# Patient Record
Sex: MALE | Race: OTHER | HISPANIC OR LATINO | URBAN - METROPOLITAN AREA
[De-identification: names, ages, dates, MRNs, and addresses within clinical notes are randomized per-mention and may not be internally consistent; named-entity substitution may affect disease eponyms.]

---

## 2020-06-28 ENCOUNTER — INPATIENT (INPATIENT)
Facility: HOSPITAL | Age: 35
LOS: 3 days | Discharge: HOME | End: 2020-07-02
Attending: INTERNAL MEDICINE | Admitting: INTERNAL MEDICINE
Payer: MEDICAID

## 2020-06-28 VITALS
OXYGEN SATURATION: 98 % | SYSTOLIC BLOOD PRESSURE: 141 MMHG | RESPIRATION RATE: 18 BRPM | DIASTOLIC BLOOD PRESSURE: 87 MMHG | TEMPERATURE: 99 F | HEART RATE: 98 BPM

## 2020-06-28 LAB
ALBUMIN SERPL ELPH-MCNC: 4.6 G/DL — SIGNIFICANT CHANGE UP (ref 3.5–5.2)
ALP SERPL-CCNC: 75 U/L — SIGNIFICANT CHANGE UP (ref 30–115)
ALT FLD-CCNC: 31 U/L — SIGNIFICANT CHANGE UP (ref 0–41)
ANION GAP SERPL CALC-SCNC: 13 MMOL/L — SIGNIFICANT CHANGE UP (ref 7–14)
AST SERPL-CCNC: 94 U/L — HIGH (ref 0–41)
BASOPHILS # BLD AUTO: 0.01 K/UL — SIGNIFICANT CHANGE UP (ref 0–0.2)
BASOPHILS NFR BLD AUTO: 0.2 % — SIGNIFICANT CHANGE UP (ref 0–1)
BILIRUB SERPL-MCNC: 0.5 MG/DL — SIGNIFICANT CHANGE UP (ref 0.2–1.2)
BUN SERPL-MCNC: 7 MG/DL — LOW (ref 10–20)
CALCIUM SERPL-MCNC: 8.9 MG/DL — SIGNIFICANT CHANGE UP (ref 8.5–10.1)
CHLORIDE SERPL-SCNC: 98 MMOL/L — SIGNIFICANT CHANGE UP (ref 98–110)
CK SERPL-CCNC: 2601 U/L — HIGH (ref 0–225)
CO2 SERPL-SCNC: 26 MMOL/L — SIGNIFICANT CHANGE UP (ref 17–32)
CREAT SERPL-MCNC: 0.6 MG/DL — LOW (ref 0.7–1.5)
EOSINOPHIL # BLD AUTO: 0.01 K/UL — SIGNIFICANT CHANGE UP (ref 0–0.7)
EOSINOPHIL NFR BLD AUTO: 0.2 % — SIGNIFICANT CHANGE UP (ref 0–8)
GLUCOSE SERPL-MCNC: 119 MG/DL — HIGH (ref 70–99)
HCT VFR BLD CALC: 37 % — LOW (ref 42–52)
HGB BLD-MCNC: 13.3 G/DL — LOW (ref 14–18)
IMM GRANULOCYTES NFR BLD AUTO: 0.3 % — SIGNIFICANT CHANGE UP (ref 0.1–0.3)
LYMPHOCYTES # BLD AUTO: 0.96 K/UL — LOW (ref 1.2–3.4)
LYMPHOCYTES # BLD AUTO: 14.6 % — LOW (ref 20.5–51.1)
MCHC RBC-ENTMCNC: 31.4 PG — HIGH (ref 27–31)
MCHC RBC-ENTMCNC: 35.9 G/DL — SIGNIFICANT CHANGE UP (ref 32–37)
MCV RBC AUTO: 87.3 FL — SIGNIFICANT CHANGE UP (ref 80–94)
MONOCYTES # BLD AUTO: 0.55 K/UL — SIGNIFICANT CHANGE UP (ref 0.1–0.6)
MONOCYTES NFR BLD AUTO: 8.4 % — SIGNIFICANT CHANGE UP (ref 1.7–9.3)
NEUTROPHILS # BLD AUTO: 5.01 K/UL — SIGNIFICANT CHANGE UP (ref 1.4–6.5)
NEUTROPHILS NFR BLD AUTO: 76.3 % — HIGH (ref 42.2–75.2)
NRBC # BLD: 0 /100 WBCS — SIGNIFICANT CHANGE UP (ref 0–0)
PLATELET # BLD AUTO: 147 K/UL — SIGNIFICANT CHANGE UP (ref 130–400)
POTASSIUM SERPL-MCNC: 3.2 MMOL/L — LOW (ref 3.5–5)
POTASSIUM SERPL-SCNC: 3.2 MMOL/L — LOW (ref 3.5–5)
PROT SERPL-MCNC: 7.2 G/DL — SIGNIFICANT CHANGE UP (ref 6–8)
RBC # BLD: 4.24 M/UL — LOW (ref 4.7–6.1)
RBC # FLD: 12.9 % — SIGNIFICANT CHANGE UP (ref 11.5–14.5)
SODIUM SERPL-SCNC: 137 MMOL/L — SIGNIFICANT CHANGE UP (ref 135–146)
WBC # BLD: 6.56 K/UL — SIGNIFICANT CHANGE UP (ref 4.8–10.8)
WBC # FLD AUTO: 6.56 K/UL — SIGNIFICANT CHANGE UP (ref 4.8–10.8)

## 2020-06-28 PROCEDURE — 99285 EMERGENCY DEPT VISIT HI MDM: CPT

## 2020-06-28 RX ORDER — KETOROLAC TROMETHAMINE 30 MG/ML
30 SYRINGE (ML) INJECTION ONCE
Refills: 0 | Status: DISCONTINUED | OUTPATIENT
Start: 2020-06-28 | End: 2020-06-28

## 2020-06-28 RX ORDER — SODIUM CHLORIDE 9 MG/ML
1000 INJECTION INTRAMUSCULAR; INTRAVENOUS; SUBCUTANEOUS ONCE
Refills: 0 | Status: COMPLETED | OUTPATIENT
Start: 2020-06-28 | End: 2020-06-28

## 2020-06-28 RX ORDER — ACETAMINOPHEN 500 MG
650 TABLET ORAL ONCE
Refills: 0 | Status: COMPLETED | OUTPATIENT
Start: 2020-06-28 | End: 2020-06-28

## 2020-06-28 RX ADMIN — Medication 650 MILLIGRAM(S): at 22:34

## 2020-06-28 RX ADMIN — SODIUM CHLORIDE 1000 MILLILITER(S): 9 INJECTION INTRAMUSCULAR; INTRAVENOUS; SUBCUTANEOUS at 22:33

## 2020-06-28 RX ADMIN — Medication 30 MILLIGRAM(S): at 22:34

## 2020-06-28 NOTE — ED PROVIDER NOTE - ATTENDING CONTRIBUTION TO CARE
1 week of sub fever, body aches, headache, chest congestion. denies covid exposure. exam shows well appearing no rash, no neck rigidity, lungs cta. plan to obtain imaging and labs. 1 week of sub fever, body aches, headache, chest congestion and weight loss. denies covid exposure. exam shows well appearing no rash, no neck rigidity, lungs cta. plan to obtain imaging and labs.

## 2020-06-28 NOTE — ED PROVIDER NOTE - PHYSICAL EXAMINATION
CONSTITUTIONAL: Well-appearing; well-nourished; in no apparent distress.   EYES: PERRL; EOM intact.   ENT: normal nose; no rhinorrhea; normal pharynx with no tonsillar hypertrophy.   NECK: Supple; non-tender; no cervical lymphadenopathy. No JVD.   CARDIOVASCULAR: Normal S1, S2; no murmurs, rubs, or gallops.   RESPIRATORY: Normal chest excursion with respiration; breath sounds clear and equal bilaterally; no wheezes, rhonchi, or rales.  GI/: Normal bowel sounds; non-distended; non-tender; no palpable organomegaly.   MS: No evidence of trauma or deformity. Non-tender to palpation. No scoliosis. No CVA tenderness. Normal ROM in all four extremities; non-tender to palpation; distal pulses are normal.   SKIN: Normal for age and race; warm; dry; good turgor; no apparent lesions or exudate.   NEURO/PSYCH: A & O x 4; grossly unremarkable.

## 2020-06-28 NOTE — ED PROVIDER NOTE - NS ED ROS FT
Constitutional: + chills + weight loss  no fever, change in appetite or malaise  Eyes: no redness/discharge/pain/vision changes  ENT: no rhinorrhea/ear pain/sore throat  Cardiac: No chest pain, SOB or edema.  Respiratory: + cough No respiratory distress  GI: No nausea, vomiting, diarrhea or abdominal pain.  : No dysuria, frequency, urgency or hematuria  MS: + myalgia no pain to back or extremities, no loss of ROM, no weakness  Neuro: No headache or weakness. No LOC.  Skin: No skin rash.  Endocrine: No history of thyroid disease or diabetes.

## 2020-06-28 NOTE — ED PROVIDER NOTE - CLINICAL SUMMARY MEDICAL DECISION MAKING FREE TEXT BOX
body aches with history of exertion, found to have elevated CK consistent with rhabdo, started on ivf for hydration and will admit for repeat CK labs.

## 2020-06-28 NOTE — ED PROVIDER NOTE - OBJECTIVE STATEMENT
34 yo male no sig hx present c/o flu like illness x 1 week. + body aches + cough + weakness + chill. Denies fever/ sore throat/ ear pain. denies sick contact and recent travel. Denies HA/dizziness/chest pain/abd pain/vomiting and diarrhea/urinary sxs.  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 denies fall and injury. denies hx of STD. also reports excessive weight loss over the past 3 months.

## 2020-06-29 LAB
A1C WITH ESTIMATED AVERAGE GLUCOSE RESULT: 5.7 % — HIGH (ref 4–5.6)
ALBUMIN SERPL ELPH-MCNC: 4 G/DL — SIGNIFICANT CHANGE UP (ref 3.5–5.2)
ALP SERPL-CCNC: 63 U/L — SIGNIFICANT CHANGE UP (ref 30–115)
ALT FLD-CCNC: 26 U/L — SIGNIFICANT CHANGE UP (ref 0–41)
ANION GAP SERPL CALC-SCNC: 12 MMOL/L — SIGNIFICANT CHANGE UP (ref 7–14)
APPEARANCE UR: CLEAR — SIGNIFICANT CHANGE UP
APTT BLD: 29.8 SEC — SIGNIFICANT CHANGE UP (ref 27–39.2)
AST SERPL-CCNC: 76 U/L — HIGH (ref 0–41)
BASOPHILS # BLD AUTO: 0.01 K/UL — SIGNIFICANT CHANGE UP (ref 0–0.2)
BASOPHILS NFR BLD AUTO: 0.2 % — SIGNIFICANT CHANGE UP (ref 0–1)
BILIRUB SERPL-MCNC: 0.8 MG/DL — SIGNIFICANT CHANGE UP (ref 0.2–1.2)
BILIRUB UR-MCNC: NEGATIVE — SIGNIFICANT CHANGE UP
BLD GP AB SCN SERPL QL: SIGNIFICANT CHANGE UP
BUN SERPL-MCNC: 5 MG/DL — LOW (ref 10–20)
C TRACH RRNA SPEC QL NAA+PROBE: SIGNIFICANT CHANGE UP
CALCIUM SERPL-MCNC: 7.7 MG/DL — LOW (ref 8.5–10.1)
CHLORIDE SERPL-SCNC: 106 MMOL/L — SIGNIFICANT CHANGE UP (ref 98–110)
CHOLEST SERPL-MCNC: 118 MG/DL — SIGNIFICANT CHANGE UP (ref 100–200)
CK MB CFR SERPL CALC: 40.2 NG/ML — HIGH (ref 0.6–6.3)
CK SERPL-CCNC: 1852 U/L — HIGH (ref 0–225)
CO2 SERPL-SCNC: 24 MMOL/L — SIGNIFICANT CHANGE UP (ref 17–32)
COLOR SPEC: COLORLESS — SIGNIFICANT CHANGE UP
CREAT SERPL-MCNC: 0.5 MG/DL — LOW (ref 0.7–1.5)
CRP SERPL-MCNC: 0.14 MG/DL — SIGNIFICANT CHANGE UP (ref 0–0.4)
D DIMER BLD IA.RAPID-MCNC: 162 NG/ML DDU — SIGNIFICANT CHANGE UP (ref 0–230)
DIFF PNL FLD: NEGATIVE — SIGNIFICANT CHANGE UP
EOSINOPHIL # BLD AUTO: 0.02 K/UL — SIGNIFICANT CHANGE UP (ref 0–0.7)
EOSINOPHIL NFR BLD AUTO: 0.4 % — SIGNIFICANT CHANGE UP (ref 0–8)
ERYTHROCYTE [SEDIMENTATION RATE] IN BLOOD: 8 MM/HR — SIGNIFICANT CHANGE UP (ref 0–10)
ESTIMATED AVERAGE GLUCOSE: 117 MG/DL — HIGH (ref 68–114)
FLU A RESULT: NEGATIVE — SIGNIFICANT CHANGE UP
FLU A RESULT: NEGATIVE — SIGNIFICANT CHANGE UP
FLUAV AG NPH QL: NEGATIVE — SIGNIFICANT CHANGE UP
FLUBV AG NPH QL: NEGATIVE — SIGNIFICANT CHANGE UP
GLUCOSE SERPL-MCNC: 91 MG/DL — SIGNIFICANT CHANGE UP (ref 70–99)
GLUCOSE UR QL: NEGATIVE — SIGNIFICANT CHANGE UP
HCT VFR BLD CALC: 34.4 % — LOW (ref 42–52)
HDLC SERPL-MCNC: 59 MG/DL — SIGNIFICANT CHANGE UP
HGB BLD-MCNC: 12.1 G/DL — LOW (ref 14–18)
IMM GRANULOCYTES NFR BLD AUTO: 0.4 % — HIGH (ref 0.1–0.3)
INR BLD: 0.98 RATIO — SIGNIFICANT CHANGE UP (ref 0.65–1.3)
KETONES UR-MCNC: NEGATIVE — SIGNIFICANT CHANGE UP
LEUKOCYTE ESTERASE UR-ACNC: NEGATIVE — SIGNIFICANT CHANGE UP
LIPID PNL WITH DIRECT LDL SERPL: 51 MG/DL — SIGNIFICANT CHANGE UP (ref 4–129)
LYMPHOCYTES # BLD AUTO: 0.75 K/UL — LOW (ref 1.2–3.4)
LYMPHOCYTES # BLD AUTO: 13.1 % — LOW (ref 20.5–51.1)
MAGNESIUM SERPL-MCNC: 2.1 MG/DL — SIGNIFICANT CHANGE UP (ref 1.8–2.4)
MCHC RBC-ENTMCNC: 30.9 PG — SIGNIFICANT CHANGE UP (ref 27–31)
MCHC RBC-ENTMCNC: 35.2 G/DL — SIGNIFICANT CHANGE UP (ref 32–37)
MCV RBC AUTO: 87.8 FL — SIGNIFICANT CHANGE UP (ref 80–94)
MONOCYTES # BLD AUTO: 0.58 K/UL — SIGNIFICANT CHANGE UP (ref 0.1–0.6)
MONOCYTES NFR BLD AUTO: 10.2 % — HIGH (ref 1.7–9.3)
N GONORRHOEA RRNA SPEC QL NAA+PROBE: SIGNIFICANT CHANGE UP
NEUTROPHILS # BLD AUTO: 4.33 K/UL — SIGNIFICANT CHANGE UP (ref 1.4–6.5)
NEUTROPHILS NFR BLD AUTO: 75.7 % — HIGH (ref 42.2–75.2)
NITRITE UR-MCNC: NEGATIVE — SIGNIFICANT CHANGE UP
NRBC # BLD: 0 /100 WBCS — SIGNIFICANT CHANGE UP (ref 0–0)
PH UR: 7 — SIGNIFICANT CHANGE UP (ref 5–8)
PHOSPHATE SERPL-MCNC: 3.9 MG/DL — SIGNIFICANT CHANGE UP (ref 2.1–4.9)
PLATELET # BLD AUTO: 122 K/UL — LOW (ref 130–400)
POTASSIUM SERPL-MCNC: 3.2 MMOL/L — LOW (ref 3.5–5)
POTASSIUM SERPL-SCNC: 3.2 MMOL/L — LOW (ref 3.5–5)
PROCALCITONIN SERPL-MCNC: 0.04 NG/ML — SIGNIFICANT CHANGE UP (ref 0.02–0.1)
PROT SERPL-MCNC: 6 G/DL — SIGNIFICANT CHANGE UP (ref 6–8)
PROT UR-MCNC: NEGATIVE — SIGNIFICANT CHANGE UP
PROTHROM AB SERPL-ACNC: 11.3 SEC — SIGNIFICANT CHANGE UP (ref 9.95–12.87)
RBC # BLD: 3.92 M/UL — LOW (ref 4.7–6.1)
RBC # FLD: 13.1 % — SIGNIFICANT CHANGE UP (ref 11.5–14.5)
RSV RESULT: NEGATIVE — SIGNIFICANT CHANGE UP
RSV RNA RESP QL NAA+PROBE: NEGATIVE — SIGNIFICANT CHANGE UP
SARS-COV-2 RNA SPEC QL NAA+PROBE: SIGNIFICANT CHANGE UP
SODIUM SERPL-SCNC: 142 MMOL/L — SIGNIFICANT CHANGE UP (ref 135–146)
SP GR SPEC: 1 — LOW (ref 1.01–1.02)
SPECIMEN SOURCE: SIGNIFICANT CHANGE UP
TOTAL CHOLESTEROL/HDL RATIO MEASUREMENT: 2 RATIO — LOW (ref 4–5.5)
TRIGL SERPL-MCNC: 30 MG/DL — SIGNIFICANT CHANGE UP (ref 10–149)
TROPONIN T SERPL-MCNC: <0.01 NG/ML — SIGNIFICANT CHANGE UP
URATE SERPL-MCNC: 3.6 MG/DL — SIGNIFICANT CHANGE UP (ref 3.4–8.8)
UROBILINOGEN FLD QL: SIGNIFICANT CHANGE UP
WBC # BLD: 5.71 K/UL — SIGNIFICANT CHANGE UP (ref 4.8–10.8)
WBC # FLD AUTO: 5.71 K/UL — SIGNIFICANT CHANGE UP (ref 4.8–10.8)

## 2020-06-29 PROCEDURE — 71045 X-RAY EXAM CHEST 1 VIEW: CPT | Mod: 26

## 2020-06-29 PROCEDURE — 93010 ELECTROCARDIOGRAM REPORT: CPT

## 2020-06-29 PROCEDURE — 99222 1ST HOSP IP/OBS MODERATE 55: CPT | Mod: AI

## 2020-06-29 RX ORDER — POTASSIUM CHLORIDE 20 MEQ
40 PACKET (EA) ORAL EVERY 4 HOURS
Refills: 0 | Status: COMPLETED | OUTPATIENT
Start: 2020-06-29 | End: 2020-06-29

## 2020-06-29 RX ORDER — CHLORHEXIDINE GLUCONATE 213 G/1000ML
1 SOLUTION TOPICAL
Refills: 0 | Status: DISCONTINUED | OUTPATIENT
Start: 2020-06-29 | End: 2020-07-02

## 2020-06-29 RX ORDER — SODIUM CHLORIDE 9 MG/ML
1000 INJECTION INTRAMUSCULAR; INTRAVENOUS; SUBCUTANEOUS
Refills: 0 | Status: DISCONTINUED | OUTPATIENT
Start: 2020-06-29 | End: 2020-07-01

## 2020-06-29 RX ORDER — ENOXAPARIN SODIUM 100 MG/ML
40 INJECTION SUBCUTANEOUS DAILY
Refills: 0 | Status: DISCONTINUED | OUTPATIENT
Start: 2020-06-29 | End: 2020-07-02

## 2020-06-29 RX ORDER — PANTOPRAZOLE SODIUM 20 MG/1
40 TABLET, DELAYED RELEASE ORAL
Refills: 0 | Status: DISCONTINUED | OUTPATIENT
Start: 2020-06-29 | End: 2020-07-02

## 2020-06-29 RX ORDER — ACETAMINOPHEN 500 MG
650 TABLET ORAL EVERY 6 HOURS
Refills: 0 | Status: DISCONTINUED | OUTPATIENT
Start: 2020-06-29 | End: 2020-07-02

## 2020-06-29 RX ADMIN — PANTOPRAZOLE SODIUM 40 MILLIGRAM(S): 20 TABLET, DELAYED RELEASE ORAL at 07:55

## 2020-06-29 RX ADMIN — SODIUM CHLORIDE 200 MILLILITER(S): 9 INJECTION INTRAMUSCULAR; INTRAVENOUS; SUBCUTANEOUS at 07:45

## 2020-06-29 RX ADMIN — SODIUM CHLORIDE 100 MILLILITER(S): 9 INJECTION INTRAMUSCULAR; INTRAVENOUS; SUBCUTANEOUS at 08:55

## 2020-06-29 RX ADMIN — Medication 40 MILLIEQUIVALENT(S): at 11:37

## 2020-06-29 RX ADMIN — SODIUM CHLORIDE 100 MILLILITER(S): 9 INJECTION INTRAMUSCULAR; INTRAVENOUS; SUBCUTANEOUS at 14:42

## 2020-06-29 RX ADMIN — SODIUM CHLORIDE 100 MILLILITER(S): 9 INJECTION INTRAMUSCULAR; INTRAVENOUS; SUBCUTANEOUS at 21:56

## 2020-06-29 RX ADMIN — Medication 40 MILLIEQUIVALENT(S): at 14:38

## 2020-06-29 RX ADMIN — SODIUM CHLORIDE 200 MILLILITER(S): 9 INJECTION INTRAMUSCULAR; INTRAVENOUS; SUBCUTANEOUS at 01:06

## 2020-06-29 RX ADMIN — ENOXAPARIN SODIUM 40 MILLIGRAM(S): 100 INJECTION SUBCUTANEOUS at 11:37

## 2020-06-29 NOTE — H&P ADULT - ATTENDING COMMENTS
35 yr Arabic speaking male came to ED for fever, body aches, headache and cough. Symptoms started 3 days ago, he has sick contact with his roommate who was diagnosed with COVID-19, he has cough with mild streaks of blood, also has severe frontal headache, pleuritic chest pain, he reports watery diarrhea. In ED: VS:WNL, labs significant for high CK, negative UA, Flu , RSV negative, COVID 19 pending, CXR no congestion/diffuse opacities.     PHYSICAL EXAM:  GENERAL: NAD, well-developed.  HEAD:  Atraumatic, Normocephalic.  EYES: EOMI, PERRLA, conjunctiva and sclera clear.  NECK: Supple, No JVD.  CHEST/LUNG: Clear to auscultation bilaterally; No wheeze.  HEART: Regular rate and rhythm; S1 S2.   ABDOMEN: Soft, Nontender, Nondistended; Bowel sounds present.  EXTREMITIES:  2+ Peripheral Pulses, No clubbing, cyanosis, or edema.  PSYCH: AAOx3.  NEUROLOGY: non-focal.  SKIN: No rashes or lesions.    A/P:   Upper respiratory tract infection: likely viral. Typical symptoms for COVID-19  Influenza negative. Pending COVID swab.   Tylenol prn. CXR is clear. No indication for further treatment.     Mild Rhabdomyolysis: unclear etiology, possible from fever, he denies fall, trauma. Continue IV fluid.

## 2020-06-29 NOTE — H&P ADULT - ASSESSMENT
35 yr old Male with no Pmh or Psh came to ED with chief complaint of  3 days of subjective fever, body aches,eye aches,  fatigue , bifrontal headache. A week ago he noticed cough with blood tinged sputum, pleuritic chest pain. he also reports of having loose watery stools for 4 days around 4-5 Bm per day.  Has covid 19 exposure as one of his roomates was tested positive last week. Patient also reports that he has lost 10lbs in last 3 months. Denies night sweats, trauma or crush injuries, dark urine. In ED: VS:WNL, labs significant for high CK, negative UA, Flu , RSV negative, COVID 19 pending, CXR no congestion/diffuse opacities. Patient sexually active, denies any penile discharge.    #fever/cough/ body aches/diarrhea/elevated CK likely rhabdomyolysis r/o viral respiratory syndrome  - low grade temp+ sepsis ruled out on admission, CK:2000  - Flu A, B , RSV negative  - COVID swab pending, exposure+  - CXR: no congestion/opacities  - UA negative for hematuria, fu urine cx (sent by ED)  - fu GC/chlamydia/ HIV  - fu EKG   - fu procal, CRP, ESR, calcium , phosphorus, uric acid, repeat CK  - Supportive care: tylenol  pain, antussive, antiemetics  - montior for hypoxia and if diarrhea continue send GI PCR  - isolation precautions    #Misc:  ACTIVITY: Increase as tolerated   DVT PPX: lovenox  GI PPX: protonix  DIET: Regular  CODE: Full  DIsposition: from home; 35 yr old Male with no Pmh or Psh came to ED with chief complaint of  3 days of subjective fever, body aches,eye aches,  fatigue , bifrontal headache. A week ago he noticed cough with blood tinged sputum, pleuritic chest pain. he also reports of having loose watery stools for 4 days around 4-5 Bm per day.  Has covid 19 exposure as one of his roomates was tested positive last week. Patient also reports that he has lost 10lbs in last 3 months. Denies night sweats, trauma or crush injuries, dark urine. In ED: VS:WNL, labs significant for high CK, negative UA, Flu , RSV negative, COVID 19 pending, CXR no congestion/diffuse opacities. Patient sexually active, denies any penile discharge.    #fever/cough/ body aches/diarrhea/elevated CK likely rhabdomyolysis r/o viral respiratory syndrome  - low grade temp+ sepsis ruled out on admission, CK:2000  - Flu A, B , RSV negative  - COVID swab pending, exposure+  - CXR: no congestion/opacities  - UA negative for hematuria, fu urine cx (sent by ED)  - fu GC/chlamydia/ HIV  - fu EKG   - fu procal, CRP, ESR, calcium , phosphorus, uric acid, repeat CK  - c/w IV NS   - Supportive care: tylenol  pain, antussive, antiemetics  - montior for hypoxia and if diarrhea continues send GI PCR  - isolation precautions    #Misc:  ACTIVITY: Increase as tolerated   DVT PPX: lovenox  GI PPX: protonix  DIET: Regular  CODE: Full  DIsposition: from home;

## 2020-06-29 NOTE — H&P ADULT - NSHPREVIEWOFSYSTEMS_GEN_ALL_CORE
CONSTITUTIONAL:  weakness, fevers or chills+  EYES/ENT: No visual changes;  No vertigo or throat pain   NECK: No pain or stiffness  RESPIRATORY:  cough+, no wheezing,  No shortness of breath  CARDIOVASCULAR: No chest pain or palpitations  GASTROINTESTINAL: Diarrhea+ No abdominal or epigastric pain. No nausea, vomiting, or hematemesis; No constipation. No melena or hematochezia.  GENITOURINARY: No dysuria, frequency or hematuria  NEUROLOGICAL: No numbness or weakness  SKIN: No itching, rashes

## 2020-06-29 NOTE — H&P ADULT - NSHPLABSRESULTS_GEN_ALL_CORE
13.3   6.56  )-----------( 147      ( 2020 22:38 )             37.0           137  |  98  |  7<L>  ----------------------------<  119<H>  3.2<L>   |  26  |  0.6<L>    Ca    8.9      2020 22:38    TPro  7.2  /  Alb  4.6  /  TBili  0.5  /  DBili  x   /  AST  94<H>  /  ALT  31  /  AlkPhos  75                Urinalysis Basic - ( 2020 22:21 )    Color: Colorless / Appearance: Clear / S.005 / pH: x  Gluc: x / Ketone: Negative  / Bili: Negative / Urobili: <2 mg/dL   Blood: x / Protein: Negative / Nitrite: Negative   Leuk Esterase: Negative / RBC: x / WBC x   Sq Epi: x / Non Sq Epi: x / Bacteria: x      CARDIAC MARKERS ( 2020 22:38 )  x     / x     / 2601 U/L / x     / x            CAPILLARY BLOOD GLUCOSE      POCT Blood Glucose.: 122 mg/dL (2020 22:13)

## 2020-06-29 NOTE — H&P ADULT - NSHPPHYSICALEXAM_GEN_ALL_CORE
PHYSICAL EXAM:  GENERAL: NAD, AAO x 4, 35y M  HEAD:  Atraumatic, Normocephalic  EYES: EOMI, conjunctiva and sclera white  NECK: Supple, No JVD  CHEST/LUNG: Clear to auscultation bilaterally; No wheeze; No crackles; No accessory muscles used  HEART: Regular rate and rhythm; No murmurs;   ABDOMEN: Soft, Nontender, Nondistended; Bowel sounds present; No guarding, No organomegaly  EXTREMITIES: muscle tenderness+ 2+ Peripheral Pulses, No cyanosis or edema  NEUROLOGY: non-focal

## 2020-06-29 NOTE — H&P ADULT - NSHPSOCIALHISTORY_GEN_ALL_CORE
Marital Status:  (   )    (  x ) Single    (   )    (  )   Lives with: (  ) alone  (  ) children   (  ) spouse   (  ) parents  (  x) other: roomates  Recent Travel: No recent travel  Occupation: lawn mower    Substance Use (street drugs): ( x ) never used  (  ) other:  Tobacco Usage:  ( x  ) never smoked   (   ) former smoker   (   ) current smoker  (     ) pack year  Alcohol Usage: ocassionally beer  Baseline mobility: mobile without any assistance

## 2020-06-29 NOTE — H&P ADULT - HISTORY OF PRESENT ILLNESS
35 yr old Male with no Pmh or Psh came to ED with chief complaint of  1 week of subjective fever, body aches, headache, chest congestion and weight loss. denies covid exposure. exam shows well appearing no rash, no neck rigidity, lungs cta. plan to obtain imaging and labs.    In ED: VS:WNL, labs significant for high CK, negtaive UA 35 yr old Male with no Pmh or Psh came to ED with chief complaint of  3 days of subjective fever, body aches,eye aches,  fatigue , bifrontal headache. A week ago he noticed cough with blood tinged sputum, pleuritic chest pain. he also reports of having loose watery stools for 4 days around 4-5 Bm per day.  Has covid 19 exposure as one of his roomates was tested positive last week. Patient also reports that he has lost 10lbs in last 3 months. Denies night sweats, trauma or crush injuries, dark urine.    In ED: VS:WNL, labs significant for high CK, negative UA, Flu , RSV negative, COVID 19 pending, CXR no congestion/diffuse opacities.

## 2020-06-30 LAB
ALBUMIN SERPL ELPH-MCNC: 4 G/DL — SIGNIFICANT CHANGE UP (ref 3.5–5.2)
ALP SERPL-CCNC: 76 U/L — SIGNIFICANT CHANGE UP (ref 30–115)
ALT FLD-CCNC: 29 U/L — SIGNIFICANT CHANGE UP (ref 0–41)
ANION GAP SERPL CALC-SCNC: 10 MMOL/L — SIGNIFICANT CHANGE UP (ref 7–14)
AST SERPL-CCNC: 67 U/L — HIGH (ref 0–41)
BASOPHILS # BLD AUTO: 0.01 K/UL — SIGNIFICANT CHANGE UP (ref 0–0.2)
BASOPHILS NFR BLD AUTO: 0.2 % — SIGNIFICANT CHANGE UP (ref 0–1)
BILIRUB SERPL-MCNC: 0.5 MG/DL — SIGNIFICANT CHANGE UP (ref 0.2–1.2)
BUN SERPL-MCNC: 5 MG/DL — LOW (ref 10–20)
CALCIUM SERPL-MCNC: 8 MG/DL — LOW (ref 8.5–10.1)
CHLORIDE SERPL-SCNC: 105 MMOL/L — SIGNIFICANT CHANGE UP (ref 98–110)
CK SERPL-CCNC: 1643 U/L — HIGH (ref 0–225)
CO2 SERPL-SCNC: 26 MMOL/L — SIGNIFICANT CHANGE UP (ref 17–32)
CREAT SERPL-MCNC: 0.5 MG/DL — LOW (ref 0.7–1.5)
CRP SERPL-MCNC: 0.42 MG/DL — HIGH (ref 0–0.4)
CULTURE RESULTS: NO GROWTH — SIGNIFICANT CHANGE UP
EOSINOPHIL # BLD AUTO: 0.08 K/UL — SIGNIFICANT CHANGE UP (ref 0–0.7)
EOSINOPHIL NFR BLD AUTO: 1.8 % — SIGNIFICANT CHANGE UP (ref 0–8)
FERRITIN SERPL-MCNC: 108 NG/ML — SIGNIFICANT CHANGE UP (ref 30–400)
GLUCOSE SERPL-MCNC: 94 MG/DL — SIGNIFICANT CHANGE UP (ref 70–99)
HCT VFR BLD CALC: 36.9 % — LOW (ref 42–52)
HGB BLD-MCNC: 12.8 G/DL — LOW (ref 14–18)
IMM GRANULOCYTES NFR BLD AUTO: 0.2 % — SIGNIFICANT CHANGE UP (ref 0.1–0.3)
LYMPHOCYTES # BLD AUTO: 0.81 K/UL — LOW (ref 1.2–3.4)
LYMPHOCYTES # BLD AUTO: 18.1 % — LOW (ref 20.5–51.1)
MAGNESIUM SERPL-MCNC: 1.8 MG/DL — SIGNIFICANT CHANGE UP (ref 1.8–2.4)
MCHC RBC-ENTMCNC: 31.1 PG — HIGH (ref 27–31)
MCHC RBC-ENTMCNC: 34.7 G/DL — SIGNIFICANT CHANGE UP (ref 32–37)
MCV RBC AUTO: 89.6 FL — SIGNIFICANT CHANGE UP (ref 80–94)
MONOCYTES # BLD AUTO: 0.42 K/UL — SIGNIFICANT CHANGE UP (ref 0.1–0.6)
MONOCYTES NFR BLD AUTO: 9.4 % — HIGH (ref 1.7–9.3)
NEUTROPHILS # BLD AUTO: 3.15 K/UL — SIGNIFICANT CHANGE UP (ref 1.4–6.5)
NEUTROPHILS NFR BLD AUTO: 70.3 % — SIGNIFICANT CHANGE UP (ref 42.2–75.2)
NRBC # BLD: 0 /100 WBCS — SIGNIFICANT CHANGE UP (ref 0–0)
PLATELET # BLD AUTO: 143 K/UL — SIGNIFICANT CHANGE UP (ref 130–400)
POTASSIUM SERPL-MCNC: 3.8 MMOL/L — SIGNIFICANT CHANGE UP (ref 3.5–5)
POTASSIUM SERPL-SCNC: 3.8 MMOL/L — SIGNIFICANT CHANGE UP (ref 3.5–5)
PROCALCITONIN SERPL-MCNC: 0.03 NG/ML — SIGNIFICANT CHANGE UP (ref 0.02–0.1)
PROT SERPL-MCNC: 6.1 G/DL — SIGNIFICANT CHANGE UP (ref 6–8)
RBC # BLD: 4.12 M/UL — LOW (ref 4.7–6.1)
RBC # FLD: 13.1 % — SIGNIFICANT CHANGE UP (ref 11.5–14.5)
SODIUM SERPL-SCNC: 141 MMOL/L — SIGNIFICANT CHANGE UP (ref 135–146)
SPECIMEN SOURCE: SIGNIFICANT CHANGE UP
WBC # BLD: 4.48 K/UL — LOW (ref 4.8–10.8)
WBC # FLD AUTO: 4.48 K/UL — LOW (ref 4.8–10.8)

## 2020-06-30 PROCEDURE — 99233 SBSQ HOSP IP/OBS HIGH 50: CPT

## 2020-06-30 RX ORDER — FOLIC ACID 0.8 MG
1 TABLET ORAL DAILY
Refills: 0 | Status: DISCONTINUED | OUTPATIENT
Start: 2020-06-30 | End: 2020-07-02

## 2020-06-30 RX ORDER — THIAMINE MONONITRATE (VIT B1) 100 MG
100 TABLET ORAL DAILY
Refills: 0 | Status: DISCONTINUED | OUTPATIENT
Start: 2020-06-30 | End: 2020-07-02

## 2020-06-30 RX ADMIN — Medication 1 TABLET(S): at 12:59

## 2020-06-30 RX ADMIN — Medication 650 MILLIGRAM(S): at 20:49

## 2020-06-30 RX ADMIN — Medication 1 MILLIGRAM(S): at 11:36

## 2020-06-30 RX ADMIN — SODIUM CHLORIDE 100 MILLILITER(S): 9 INJECTION INTRAMUSCULAR; INTRAVENOUS; SUBCUTANEOUS at 11:27

## 2020-06-30 RX ADMIN — PANTOPRAZOLE SODIUM 40 MILLIGRAM(S): 20 TABLET, DELAYED RELEASE ORAL at 05:40

## 2020-06-30 RX ADMIN — ENOXAPARIN SODIUM 40 MILLIGRAM(S): 100 INJECTION SUBCUTANEOUS at 12:59

## 2020-06-30 RX ADMIN — Medication 100 MILLIGRAM(S): at 11:36

## 2020-06-30 RX ADMIN — SODIUM CHLORIDE 150 MILLILITER(S): 9 INJECTION INTRAMUSCULAR; INTRAVENOUS; SUBCUTANEOUS at 17:54

## 2020-06-30 RX ADMIN — SODIUM CHLORIDE 150 MILLILITER(S): 9 INJECTION INTRAMUSCULAR; INTRAVENOUS; SUBCUTANEOUS at 22:47

## 2020-06-30 NOTE — PROGRESS NOTE ADULT - SUBJECTIVE AND OBJECTIVE BOX
ANNITA DAVIDSON  35y  Male      Patient is a 35y old  Male who presents with a chief complaint of fever and body aches on admission.  Today, seen and examined at bedside. Feeling better but, still c/o frontal headache including eyes.   C/O muscle wasting and weight loss x 2 months,  Other wise comfortable.       INTERVAL HPI/OVERNIGHT EVENTS: none      ******************************* REVIEW OF SYSTEMS:**********************************************    All other review of systems negative    *********************** VITALS ******************************************    T(F): 99.1 (20 @ 09:56)  HR: 72 (20 @ 09:56) (69 - 78)  BP: 115/77 (20 @ 09:56) (109/61 - 126/80)  RR: 18 (20 @ 09:56) (18 - 18)  SpO2: 100% (20 @ 05:00) (99% - 100%)            ******************************** PHYSICAL EXAM:**************************************************  GENERAL: NAD    PSYCH: no agitation, baseline mentation  HEENT:     NERVOUS SYSTEM:  Alert & Oriented X3, MS  5/5 B/L  UE and LE ; Sensory intact NO FND    PULMONARY: KINGSLEY, CTA    CARDIOVASCULAR: S1S2 RRR    GI: Soft, NT, ND; BS present.    EXTREMITIES:  2+ Peripheral Pulses, No clubbing, cyanosis, or edema    LYMPH: No lymphadenopathy noted    SKIN: No rashes or lesions      **************************** LABS *******************************************************                          12.8   4.48  )-----------( 143      ( 2020 07:00 )             36.9     06-30    141  |  105  |  5<L>  ----------------------------<  94  3.8   |  26  |  0.5<L>    Ca    8.0<L>      2020 07:00  Phos  3.9     -  Mg     1.8     30    TPro  6.1  /  Alb  4.0  /  TBili  0.5  /  DBili  x   /  AST  67<H>  /  ALT  29  /  AlkPhos  76  06-30      Urinalysis Basic - ( 2020 22:21 )    Color: Colorless / Appearance: Clear / S.005 / pH: x  Gluc: x / Ketone: Negative  / Bili: Negative / Urobili: <2 mg/dL   Blood: x / Protein: Negative / Nitrite: Negative   Leuk Esterase: Negative / RBC: x / WBC x   Sq Epi: x / Non Sq Epi: x / Bacteria: x      PT/INR - ( 2020 05:29 )   PT: 11.30 sec;   INR: 0.98 ratio         PTT - ( 2020 05:29 )  PTT:29.8 sec  Lactate Trend    CARDIAC MARKERS ( 2020 11:19 )  x     / x     / 1643 U/L / x     / x      CARDIAC MARKERS ( 2020 05:29 )  x     / <0.01 ng/mL / 1852 U/L / x     / 40.2 ng/mL  CARDIAC MARKERS ( 2020 22:38 )  x     / x     / 2601 U/L / x     / x          CAPILLARY BLOOD GLUCOSE      POCT Blood Glucose.: 122 mg/dL (2020 22:13)          **************************Active Medications *******************************************  No Known Allergies      acetaminophen   Tablet .. 650 milliGRAM(s) Oral every 6 hours PRN  chlorhexidine 4% Liquid 1 Application(s) Topical <User Schedule>  enoxaparin Injectable 40 milliGRAM(s) SubCutaneous daily  folic acid 1 milliGRAM(s) Oral daily  multivitamin 1 Tablet(s) Oral daily  pantoprazole    Tablet 40 milliGRAM(s) Oral before breakfast  sodium chloride 0.9%. 1000 milliLiter(s) IV Continuous <Continuous>  thiamine 100 milliGRAM(s) Oral daily      ***************************************************  RADIOLOGY & ADDITIONAL TESTS:    Imaging Personally Reviewed:  [ ] YES  [ ] NO    HEALTH ISSUES - PROBLEM Dx:

## 2020-06-30 NOTE — PROGRESS NOTE ADULT - ASSESSMENT
35 yr old Male with no Pmh or Psh came to ED with chief complaint of  3 days of subjective fever, body aches, eye aches,  fatigue , bifrontal headache. A week ago he noticed cough with blood tinged sputum, pleuritic chest pain. he also reports of having loose watery stools for 4 days around 4-5 Bm per day.  Has covid 19 exposure as one of his roomates was tested positive last week. Patient also reports that he has lost 10lbs in last 3 months. Denies night sweats, trauma or crush injuries, dark urine. In ED: VS:WNL, labs significant for high CK, negative UA, Flu , RSV negative, COVID 19 pending, CXR no congestion/diffuse opacities. Patient sexually active, denies any penile discharge.    #fever/cough/ body aches/diarrhea/elevated CK likely rhabdomyolysis r/o viral respiratory syndrome  - low grade temp+ sepsis ruled out on admission, CK:2000  - Flu A, B , RSV negative  - COVID swab negative, exposure+  - CXR: no congestion/opacities  - UA negative for hematuria, fu urine cx (sent by ED)  - fu GC/chlamydia/ HIV  - fu EKG   - fu procal, CRP, ESR, calcium , phosphorus, uric acid, repeat CK  - c/w IV NS   - Supportive care: tylenol  pain, antussive, antiemetics  - montior for hypoxia and if diarrhea continues send GI PCR  - isolation precautions  --> repeat CK  --> getting transferred    #Misc:  ACTIVITY: Increase as tolerated   DVT PPX: lovenox  GI PPX: protonix  DIET: Regular  CODE: Full  DIsposition: from home; 35 yr old Male with no Pmh or Psh came to ED with chief complaint of  3 days of subjective fever, body aches, eye aches,  fatigue , bifrontal headache. A week ago he noticed cough with blood tinged sputum, pleuritic chest pain. he also reports of having loose watery stools for 4 days around 4-5 Bm per day.  Has covid 19 exposure as one of his roomates was tested positive last week. Patient also reports that he has lost 10lbs in last 3 months. Denies night sweats, trauma or crush injuries, dark urine. In ED: VS:WNL, labs significant for high CK, negative UA, Flu , RSV negative, COVID 19 pending, CXR no congestion/diffuse opacities. Patient sexually active, denies any penile discharge.    #fever/cough/ body aches/diarrhea/elevated CK likely rhabdomyolysis r/o viral respiratory syndrome  - low grade temp+ sepsis ruled out on admission, CK:2000  - Flu A, B , RSV negative  - COVID swab negative, exposure+  - CXR: no congestion/opacities  - UA negative for hematuria, fu urine cx (sent by ED)  --> fu GC/chlamydia/ HIV  --> fu EKG   --> fu procal, CRP, ESR, calcium , phosphorus, uric acid, repeat CK  - c/w IV NS   - Supportive care: tylenol  pain, antussive, antiemetics  --> montior for hypoxia and if diarrhea continues send GI PCR  - isolation precautions  --> repeat CK  --> getting transferred    #Misc:  ACTIVITY: Increase as tolerated   DVT PPX: lovenox  GI PPX: protonix  DIET: Regular  CODE: Full  DIsposition: from home; 35 yr old Male with no Pmh or Psh came to ED with chief complaint of  3 days of subjective fever, body aches, eye aches,  fatigue , bifrontal headache. A week ago he noticed cough with blood tinged sputum, pleuritic chest pain. he also reports of having loose watery stools for 4 days around 4-5 Bm per day.  Has covid 19 exposure as one of his roomates was tested positive last week. Patient also reports that he has lost 10lbs in last 3 months. Denies night sweats, trauma or crush injuries, dark urine. In ED: VS:WNL, labs significant for high CK, negative UA, Flu , RSV negative, COVID 19 pending, CXR no congestion/diffuse opacities. Patient sexually active, denies any penile discharge.    #fever/cough/ body aches/diarrhea/elevated CK likely rhabdomyolysis r/o viral respiratory syndrome  - low grade temp+ sepsis ruled out on admission, CK:2000  - Flu A, B , RSV negative  - COVID swab negative, exposure+  - CXR: no congestion/opacities  - UA negative for hematuria, fu urine cx (sent by ED)  - c/w IV NS   - Supportive care: tylenol  pain, antussive, antiemetics  --> fu GC/chlamydia/ HIV  --> EKG showed normal sinus rhythm  --> fu procal,   --> CRP: 0.14, calcium: 7.7 , phosphorus: 3.9, uric acid: 3.6  --> repeat CK: 1852  --> f/u ESR   --> montior for hypoxia and if diarrhea continues send GI PCR  - isolation precautions  --> repeat CK  --> getting transferred    #Misc:  ACTIVITY: Increase as tolerated   DVT PPX: lovenox  GI PPX: protonix  DIET: Regular  CODE: Full  DIsposition: from home; 35 yr old Male with no Pmh or Psh came to ED with chief complaint of  3 days of subjective fever, body aches, eye aches,  fatigue , bifrontal headache. A week ago he noticed cough with blood tinged sputum, pleuritic chest pain. he also reports of having loose watery stools for 4 days around 4-5 Bm per day.  Has covid 19 exposure as one of his roomates was tested positive last week. Patient also reports that he has lost 10lbs in last 3 months. Denies night sweats, trauma or crush injuries, dark urine. In ED: VS:WNL, labs significant for high CK, negative UA, Flu , RSV negative, COVID 19 pending, CXR no congestion/diffuse opacities. Patient sexually active, denies any penile discharge.    #fever/cough/ body aches/diarrhea/elevated CK likely rhabdomyolysis r/o viral respiratory syndrome  - low grade temp+ sepsis ruled out on admission, CK:2000  - Flu A, B , RSV negative  - COVID swab negative, exposure+  - CXR: no congestion/opacities  - UA negative for hematuria, fu urine cx (sent by ED)  - c/w IV NS   - Supportive care: tylenol  pain, antussive, antiemetics  --> GC/chlamydia were negative  --> f/u HIV  --> EKG showed normal sinus rhythm  --> procal,   --> CRP: 0.14, calcium: 7.7 , phosphorus: 3.9, uric acid: 3.6  --> repeat CK: 1852  --> f/u ESR   --> montior for hypoxia and if diarrhea continues send GI PCR  - isolation precautions  --> repeat CK  --> getting transferred    #Misc:  ACTIVITY: Increase as tolerated   DVT PPX: lovenox  GI PPX: protonix  DIET: Regular  CODE: Full  DIsposition: from home; 35 yr old Male with no Pmh or Psh came to ED with chief complaint of  3 days of subjective fever, body aches, eye aches,  fatigue , bifrontal headache. A week ago he noticed cough with blood tinged sputum, pleuritic chest pain. he also reports of having loose watery stools for 4 days around 4-5 Bm per day.  Has covid 19 exposure as one of his roomates was tested positive last week. Patient also reports that he has lost 10lbs in last 3 months. Denies night sweats, trauma or crush injuries, dark urine. In ED: VS:WNL, labs significant for high CK, negative UA, Flu , RSV negative, COVID 19 pending, CXR no congestion/diffuse opacities. Patient sexually active, denies any penile discharge.    #fever/cough/ body aches/diarrhea/elevated CK likely rhabdomyolysis r/o viral respiratory syndrome  - low grade temp+ sepsis ruled out on admission, CK:2000  - Flu A, B , RSV negative  - COVID swab negative, exposure+  - CXR: no congestion/opacities  - UA negative for hematuria, fu urine cx (sent by ED)  - c/w IV NS   - Supportive care: tylenol  pain, antussive, antiemetics  --> GC/chlamydia were negative  --> f/u HIV  --> EKG showed normal sinus rhythm  --> CRP: 0.14, calcium: 7.7 , phosphorus: 3.9, uric acid: 3.6, procal: 0.04   --> repeat CK: 1852  --> f/u ESR   --> montior for hypoxia and if diarrhea continues send GI PCR  - isolation precautions  --> repeat CK  --> getting transferred    #Misc:  ACTIVITY: Increase as tolerated   DVT PPX: lovenox  GI PPX: protonix  DIET: Regular  CODE: Full  DIsposition: from home; 35 yr old Male with no Pmh or Psh came to ED with chief complaint of  3 days of subjective fever, body aches, eye aches,  fatigue , bifrontal headache. A week ago he noticed cough with blood tinged sputum, pleuritic chest pain. he also reports of having loose watery stools for 4 days around 4-5 Bm per day.  Has covid 19 exposure as one of his roomates was tested positive last week. Patient also reports that he has lost 10lbs in last 3 months. Denies night sweats, trauma or crush injuries, dark urine. In ED: VS:WNL, labs significant for high CK, negative UA, Flu , RSV negative, COVID 19 pending, CXR no congestion/diffuse opacities. Patient sexually active, denies any penile discharge.    #fever/cough/ body aches/diarrhea/elevated CK likely rhabdomyolysis r/o viral respiratory syndrome  - low grade temp+ sepsis ruled out on admission, CK:2000  - Flu A, B , RSV negative  - COVID swab negative, exposure+  - CXR: no congestion/opacities  - UA negative for hematuria, fu urine cx (sent by ED)  - c/w IV NS   - Supportive care: tylenol  pain, antussive, antiemetics  --> GC/chlamydia were negative  --> f/u HIV  --> EKG showed normal sinus rhythm  --> CRP: 0.14, calcium: 7.7 , phosphorus: 3.9, uric acid: 3.6, procal: 0.04   --> CK trending down 2601-->1852 (today)  --> f/u ESR   --> monitor for hypoxia and if diarrhea continues send GI PCR  - isolation precautions: Covid not detected---> possible transfer   --> repeat CK      #Misc:  ACTIVITY: Increase as tolerated   DVT PPX: lovenox  GI PPX: protonix  DIET: Regular  CODE: Full  DIsposition: from home; 35 yr old Male with no Pmh or Psh came to ED with chief complaint of  3 days of subjective fever, body aches, eye aches,  fatigue , bifrontal headache. A week ago he noticed cough with blood tinged sputum, pleuritic chest pain. he also reports of having loose watery stools for 4 days around 4-5 Bm per day.  Has covid 19 exposure as one of his roomates was tested positive last week. Patient also reports that he has lost 10lbs in last 3 months. Denies night sweats, trauma or crush injuries, dark urine. In ED: VS:WNL, labs significant for high CK, negative UA, Flu , RSV negative, COVID 19 pending, CXR no congestion/diffuse opacities. Patient sexually active, denies any penile discharge.    #fever/cough/ body aches/elevated CK likely rhabdomyolysis r/o viral respiratory syndrome  - low grade temp+ sepsis ruled out on admission, CK:2000  - Flu A, B , RSV negative  - COVID swab negative, exposure+  - CXR: no congestion/opacities  - UA negative for hematuria, fu urine cx (sent by ED)  - c/w IV NS   - Supportive care: tylenol  pain, antussive, antiemetics  - CRP: 0.14, calcium: 7.7 , phosphorus: 3.9, uric acid: 3.6, procal: 0.04   --> f/u ESR   - EKG showed normal sinus rhythm  - GC/chlamydia were negative  --> f/u HIV  --> CK trending down 2601-->1852 (today), repeat labs at 11:00 am  --> monitor CK  - monitoring for hypoxia: patient is currently sating well on RA (06/30/2020, 7:40 AM)  - if diarrhea continues send GI PCR: Patieny denies having diarrhea today (06/30/2020, 7:40 AM)  - isolation precautions: Covid not detected---> possible transfer   --> CT head: patient still complaining of headache and pain in both eyes.     #diarrhea (resolved)  - patient denies having diarrhea or abdominal pain this morning     #Misc:  ACTIVITY: Increase as tolerated   DVT PPX: lovenox  GI PPX: protonix  DIET: Regular  CODE: Full  DIsposition: from home; 35 yr old Male with no Pmh or Psh came to ED with chief complaint of  3 days of subjective fever, body aches, eye aches,  fatigue , bifrontal headache. A week ago he noticed cough with blood tinged sputum, pleuritic chest pain. he also reports of having loose watery stools for 4 days around 4-5 Bm per day.  Has covid 19 exposure as one of his roomates was tested positive last week. Patient also reports that he has lost 10lbs in last 3 months. Denies night sweats, trauma or crush injuries, dark urine. In ED: VS:WNL, labs significant for high CK, negative UA, Flu , RSV negative, COVID 19 pending, CXR no congestion/diffuse opacities. Patient sexually active, denies any penile discharge.    #fever/cough/ body aches/elevated CK likely rhabdomyolysis r/o viral respiratory syndrome  - low grade temp+ sepsis ruled out on admission, CK:2000  - Flu A, B , RSV negative  - COVID swab negative, exposure+  - CXR: no congestion/opacities  - UA negative for hematuria, fu urine cx (sent by ED)  - c/w IV NS   - Supportive care: tylenol  pain, antussive, antiemetics  - CRP: 0.14, calcium: 7.7 , phosphorus: 3.9, uric acid: 3.6, procal: 0.04   --> f/u ESR   - EKG showed normal sinus rhythm  - GC/chlamydia were negative  --> f/u HIV  --> CK trending down 2601-->1852 (today), repeat labs at 11:00 am  --> f/u with [facility]  --> monitor CK  - monitoring for hypoxia: patient is currently sating well on RA (06/30/2020, 7:40 AM)  - if diarrhea continues send GI PCR: Patieny denies having diarrhea today (06/30/2020, 7:40 AM)  - isolation precautions: Covid not detected---> possible transfer   --> CT head: patient still complaining of headache and pain in both eyes.     #diarrhea (resolved)  - patient denies having diarrhea or abdominal pain this morning     #Misc:  ACTIVITY: Increase as tolerated   DVT PPX: lovenox  GI PPX: protonix  DIET: Regular  CODE: Full  DIsposition: from home; 35 yr old Male with no Pmh or Psh came to ED with chief complaint of  3 days of subjective fever, body aches, eye aches,  fatigue , bifrontal headache. A week ago he noticed cough with blood tinged sputum, pleuritic chest pain. he also reports of having loose watery stools for 4 days around 4-5 Bm per day.  Has covid 19 exposure as one of his roomates was tested positive last week. Patient also reports that he has lost 10lbs in last 3 months. Denies night sweats, trauma or crush injuries, dark urine. In ED: VS:WNL, labs significant for high CK, negative UA, Flu , RSV negative, COVID 19 pending, CXR no congestion/diffuse opacities. Patient sexually active, denies any penile discharge.    #fever/cough/ body aches/weight loss/elevated CK likely rhabdomyolysis r/o viral respiratory syndrome  - low grade temp+ sepsis ruled out on admission, CK:2000  - Flu A, B , RSV negative  - COVID swab negative, exposure+  - CXR: no congestion/opacities  - UA negative for hematuria, fu urine cx (sent by ED)  - c/w IV NS   - Supportive care: tylenol  pain, antussive, antiemetics  - CRP: 0.14, calcium: 7.7 , phosphorus: 3.9, uric acid: 3.6, procal: 0.04   --> f/u ESR   - EKG showed normal sinus rhythm  - GC/chlamydia were negative  --> f/u HIV  --> CK trending down 2601-->1852 (today), repeat labs at 11:00 am  --> f/u with [facility]  --> monitor CK  - monitoring for hypoxia: patient is currently sating well on RA (06/30/2020, 7:40 AM)  - if diarrhea continues send GI PCR: Patieny denies having diarrhea today (06/30/2020, 7:40 AM)  - isolation precautions: Covid not detected---> possible transfer   --> CT head: patient still complaining of headache and pain in both eyes.     #diarrhea (resolved)  - patient denies having diarrhea or abdominal pain this morning     #Misc:  ACTIVITY: Increase as tolerated   DVT PPX: lovenox  GI PPX: protonix  DIET: Regular  CODE: Full  DIsposition: from home; 35 yr old Male with no Pmh or Psh came to ED with chief complaint of  3 days of subjective fever, body aches, eye aches,  fatigue , bifrontal headache. A week ago he noticed cough with blood tinged sputum, pleuritic chest pain. he also reports of having loose watery stools for 4 days around 4-5 Bm per day.  Has covid 19 exposure as one of his roomates was tested positive last week. Patient also reports that he has lost 10lbs in last 3 months. Denies night sweats, trauma or crush injuries, dark urine. In ED: VS:WNL, labs significant for high CK, negative UA, Flu , RSV negative, COVID 19 pending, CXR no congestion/diffuse opacities. Patient sexually active, denies any penile discharge.    #fever/cough/ body aches/weight loss/elevated CK likely rhabdomyolysis r/o viral respiratory syndrome  - low grade temp+ sepsis ruled out on admission, CK:2000  - Flu A, B , RSV negative  - COVID swab negative, exposure+  - CXR: no congestion/opacities  - UA negative for hematuria, fu urine cx (sent by ED)  - c/w IV NS   - Supportive care: tylenol  pain, antussive, antiemetics  - CRP: 0.14, calcium: 7.7 , phosphorus: 3.9, uric acid: 3.6, procal: 0.04   --> f/u ESR   - EKG showed normal sinus rhythm  - GC/chlamydia were negative  --> f/u HIV  --> CK trending down 2601-->1852 (today), repeat labs at 11:00 am  --> f/u with [facility]  --> monitor CK  - Evaluate patient for possible alcohol abuse/alcohol muscle wasting-->Thiamine ordered   - monitoring for hypoxia: patient is currently sating well on RA (06/30/2020, 7:40 AM)  - if diarrhea continues send GI PCR: Patient denies having diarrhea today (06/30/2020, 7:40 AM)  - isolation precautions: Covid not detected---> possible transfer   --> CT head: patient still complaining of headache and pain in both eyes.     #diarrhea (resolved)  - patient denies having diarrhea or abdominal pain this morning     #Misc:  ACTIVITY: Increase as tolerated   DVT PPX: lovenox  GI PPX: protonix  DIET: Regular  CODE: Full  DIsposition: from home; 35 yr old Male with no Pmh or Psh came to ED with chief complaint of  3 days of subjective fever, body aches, eye aches,  fatigue , bifrontal headache. A week ago he noticed cough with blood tinged sputum, pleuritic chest pain. he also reports of having loose watery stools for 4 days around 4-5 Bm per day.  Has covid 19 exposure as one of his roomates was tested positive last week. Patient also reports that he has lost 10lbs in last 3 months. Denies night sweats, trauma or crush injuries, dark urine. In ED: VS:WNL, labs significant for high CK, negative UA, Flu , RSV negative, COVID 19 pending, CXR no congestion/diffuse opacities. Patient sexually active, denies any penile discharge.    #fever/cough/ body aches/weight loss/elevated CK likely rhabdomyolysis r/o viral respiratory syndrome  - low grade temp+ sepsis ruled out on admission, CK:2000  - Flu A, B , RSV negative  - COVID swab negative, exposure+  - CXR: no congestion/opacities  - UA negative for hematuria, fu urine cx (sent by ED)  - c/w IV NS   - Supportive care: tylenol  pain, antussive, antiemetics  - CRP: 0.14, calcium: 7.7 , phosphorus: 3.9, uric acid: 3.6, procal: 0.04   --> f/u ESR   - EKG showed normal sinus rhythm  - GC/chlamydia were negative  --> f/u HIV  --> CK trending down 2601-->1852 (today), repeat labs at 11:00 am  --> f/u with [facility]  --> monitor CK  - Evaluate patient for possible alcohol abuse/alcohol muscle wasting-->Thiamine ordered   - monitoring for hypoxia: patient is currently sating well on RA (06/30/2020, 7:40 AM)  - if diarrhea continues send GI PCR: Patient denies having diarrhea today (06/30/2020, 7:40 AM)  - isolation precautions: Covid not detected---> possible transfer   --> may need to do a CT head: patient still complaining of headache and pain in both eyes     #diarrhea (resolved)  - patient denies having diarrhea or abdominal pain this morning     #Misc:  ACTIVITY: Increase as tolerated   DVT PPX: lovenox  GI PPX: protonix  DIET: Regular  CODE: Full  DIsposition: from home; 35 yr old Male with no Pmh or Psh came to ED with chief complaint of  3 days of subjective fever, body aches, eye aches,  fatigue , bifrontal headache. A week ago he noticed cough with blood tinged sputum, pleuritic chest pain. he also reports of having loose watery stools for 4 days around 4-5 Bm per day.  Has covid 19 exposure as one of his roomates was tested positive last week. Patient also reports that he has lost 10lbs in last 3 months. Denies night sweats, trauma or crush injuries, dark urine. In ED: VS:WNL, labs significant for high CK, negative UA, Flu , RSV negative, COVID 19 pending, CXR no congestion/diffuse opacities. Patient sexually active, denies any penile discharge.    #fever/cough/ body aches/weight loss/elevated CK likely rhabdomyolysis r/o viral respiratory syndrome  - low grade temp+ sepsis ruled out on admission, CK:2000  - Flu A, B , RSV negative  - COVID swab negative, exposure+  - CXR: no congestion/opacities  - UA negative for hematuria, fu urine cx (sent by ED)  - IV NS 1000 mL @ 100 cc--> 150 cc (today 06/30/2020 at 2:48)  - Supportive care: tylenol  pain, antussive, antiemetics  - CRP: 0.14, calcium: 7.7 , phosphorus: 3.9, uric acid: 3.6, procal: 0.04   --> f/u ESR   - EKG showed normal sinus rhythm  - GC/chlamydia were negative  --> f/u HIV  --> CK trending down 2601-->1852 (today), repeat labs at 11:00 am--> CK 1643  --> f/u with [facility]  --> continue to monitor CK  - Evaluate patient for possible alcohol abuse/alcohol muscle wasting-->Thiamine ordered   - monitoring for hypoxia: patient is currently sating well on RA (06/30/2020, 7:40 AM)  - if diarrhea continues send GI PCR: Patient denies having diarrhea today (06/30/2020, 7:40 AM)  - isolation precautions: Covid not detected---> possible transfer   --> may need to do a CT head with contrast if repeat CK keeps trending down ( <1000): patient still complaining of headache and pain in both eyes    #diarrhea (resolved)  - patient denies having diarrhea or abdominal pain this morning     #Misc:  ACTIVITY: Increase as tolerated   DVT PPX: lovenox  GI PPX: protonix  DIET: Regular  CODE: Full  DIsposition: from home;

## 2020-06-30 NOTE — PROGRESS NOTE ADULT - ASSESSMENT
35 yr old Male with no Pmh or Psh came to ED with chief complaint of  3 days of subjective fever, body aches, eye aches,  fatigue , bifrontal headache. A week ago he noticed cough with blood tinged sputum, pleuritic chest pain. he also reports of having loose watery stools for 4 days around 4-5 Bm per day.  Has covid 19 exposure as one of his roomates was tested positive last week. Patient also reports that he has lost 10lbs in last 3 months. Denies night sweats, trauma or crush injuries, dark urine. In ED: VS:WNL, labs significant for high CK, negative UA, Flu , RSV negative, COVID 19 pending, CXR no congestion/diffuse opacities. Patient sexually active, denies any penile discharge.    # Rhabdomyolysis due to possible  viral  syndrome  - low grade temp+ sepsis ruled out on admission, CK:2000  - Flu A, B , RSV negative  - COVID swab negative, exposure+  - CXR: no congestion/opacities  - UA negative for hematuria, fu urine cx (sent by ED)  - c/w IV NS   - Supportive care: tylenol for pain  - CRP: 0.14, calcium: 7.7 , phosphorus: 3.9, uric acid: 3.6, procal: 0.04   --> f/u ESR   - EKG showed normal sinus rhythm  - GC/chlamydia were negative  --> f/u HIV  --> CK trending down 2601-->1852 (today), repeat labs at 11:00 am  --> f/u with [facility]  --> monitor CK  - Evaluate patient for possible alcohol abuse/alcohol muscle wasting-->Thiamine ordered   - monitoring for hypoxia: patient is currently sating well on RA   - if diarrhea continues send GI PCR: Patient denies having diarrhea today (06/30/2020, 7:40 AM)  --> may need to do a CT head with contrast  if repeat CK keeps trending down ( <1k) :       patient still complaining of headache and pain in both eyes             #diarrhea (resolved)  - patient denies having diarrhea or abdominal pain this morning     #Misc:  ACTIVITY: Increase as tolerated   DVT PPX: lovenox  GI PPX: protonix  DIET: Regular  CODE: Full  DIsposition: from home;     #Progress Note Handoff  Pending (specify):  Clinical improvement_  Family discussion: d/w the patient at bedside.   Disposition: Home_

## 2020-06-30 NOTE — PROGRESS NOTE ADULT - SUBJECTIVE AND OBJECTIVE BOX
ANNITA DAVIDSON 35y Male  MRN#: [id number]   Hospital Day: 1d    SUBJECTIVE  Patient is a 35y old Male who presents with a chief complaint of rhabdomyolysis (2020 01:28)  Currently admitted to medicine with the primary diagnosis of Rhabdomyolysis    INTERVAL HPI AND OVERNIGHT EVENTS:  Patient was examined and seen at bedside. This morning he is resting comfortably in bed and reports no issues or overnight events. He's a possible transfer, his COVID negative.     REVIEW OF SYMPTOMS:  CONSTITUTIONAL: No weakness, fevers or chills; No headaches  EYES: No visual changes, eye pain, or discharge  ENT: No vertigo; No ear pain or change in hearing; No sore throat or difficulty swallowing  NECK: No pain or stiffness  RESPIRATORY: No cough, wheezing, or hemoptysis; No shortness of breath  CARDIOVASCULAR: No chest pain or palpitations  GASTROINTESTINAL: No abdominal or epigastric pain; No nausea, vomiting, or hematemesis; No diarrhea or constipation; No melena or hematochezia  GENITOURINARY: No dysuria, frequency or hematuria  MUSCULOSKELETAL: No joint pain, no muscle pain, no weakness  NEUROLOGICAL: No numbness or weakness  SKIN: No itching or rashes    OBJECTIVE  PAST MEDICAL & SURGICAL HISTORY  No pertinent past medical history  No significant past surgical history    ALLERGIES:  No Known Allergies    MEDICATIONS:  STANDING MEDICATIONS  chlorhexidine 4% Liquid 1 Application(s) Topical <User Schedule>  enoxaparin Injectable 40 milliGRAM(s) SubCutaneous daily  pantoprazole    Tablet 40 milliGRAM(s) Oral before breakfast  sodium chloride 0.9%. 1000 milliLiter(s) IV Continuous <Continuous>    PRN MEDICATIONS  acetaminophen   Tablet .. 650 milliGRAM(s) Oral every 6 hours PRN      VITAL SIGNS: Last 24 Hours  T(C): 36.6 (2020 05:00), Max: 37.3 (2020 07:21)  T(F): 97.8 (2020 05:00), Max: 99.1 (2020 07:21)  HR: 72 (2020 05:00) (65 - 78)  BP: 114/69 (2020 05:00) (109/61 - 126/80)  BP(mean): --  RR: 18 (2020 05:00) (18 - 18)  SpO2: 100% (2020 05:00) (98% - 100%)    LABS:                        12.1   5.71  )-----------( 122      ( 2020 05:29 )             34.4         142  |  106  |  5<L>  ----------------------------<  91  3.2<L>   |  24  |  0.5<L>    Ca    7.7<L>      2020 05:29  Phos  3.9       Mg     2.1         TPro  6.0  /  Alb  4.0  /  TBili  0.8  /  DBili  x   /  AST  76<H>  /  ALT  26  /  AlkPhos  63      PT/INR - ( 2020 05:29 )   PT: 11.30 sec;   INR: 0.98 ratio         PTT - ( 2020 05:29 )  PTT:29.8 sec  Urinalysis Basic - ( 2020 22:21 )    Color: Colorless / Appearance: Clear / S.005 / pH: x  Gluc: x / Ketone: Negative  / Bili: Negative / Urobili: <2 mg/dL   Blood: x / Protein: Negative / Nitrite: Negative   Leuk Esterase: Negative / RBC: x / WBC x   Sq Epi: x / Non Sq Epi: x / Bacteria: x            CARDIAC MARKERS ( 2020 05:29 )  x     / <0.01 ng/mL / 1852 U/L / x     / 40.2 ng/mL  CARDIAC MARKERS ( 2020 22:38 )  x     / x     / 2601 U/L / x     / x          RADIOLOGY:      PHYSICAL EXAM:  CONSTITUTIONAL: No acute distress, well-developed, well-groomed, AAOx3  HEAD: Atraumatic, normocephalic  EYES: EOM intact, PERRLA, conjunctiva and sclera clear  ENT: Supple, no masses, no thyromegaly, no bruits, no JVD; moist mucous membranes  PULMONARY: Clear to auscultation bilaterally; no wheezes, rales, or rhonchi  CARDIOVASCULAR: Regular rate and rhythm; no murmurs, rubs, or gallops  GASTROINTESTINAL: Soft, non-tender, non-distended; bowel sounds present  MUSCULOSKELETAL: 2+ peripheral pulses; no clubbing, no cyanosis, no edema  NEUROLOGY: non-focal  SKIN: No rashes or lesions; warm and dry      PAST MEDICAL & SURGICAL HISTORY:  No pertinent past medical history  No significant past surgical history ANNITA DAVIDSON 35y Male  MRN#: [id number]   Hospital Day: 1d    SUBJECTIVE  Patient is a 35y old Male who presented with a chief complaint of subjective fever and body aches x3 days. He also has pain in both eye, fatigue, bifrontal headache. He also noted having pleuritic chest pain as well as cough with blood tinged sputum. CXR was negative, no opacities or congestions. He also noted having diarrhea (4-5 BMs/day) x4 days, which is now resolved.  He was found to have elevated CK () on admission. (2020 01:28). Currently admitted to medicine with the primary diagnosis of Rhabdomyolysis. He has no significant PMHx. Patient is COVID negative.        INTERVAL HPI AND OVERNIGHT EVENTS:  Patient was examined and seen at bedside. This morning he is resting comfortably in bed and reports no issues or overnight events. Complains of mild headache and pain in both eyes.   REVIEW OF SYMPTOMS:  CONSTITUTIONAL: No weakness, fevers or chills; No headaches  EYES: No visual changes, eye pain, or discharge  ENT: No vertigo; No ear pain or change in hearing; No sore throat or difficulty swallowing  NECK: No pain or stiffness  RESPIRATORY: No cough, wheezing, or hemoptysis; No shortness of breath  CARDIOVASCULAR: No chest pain or palpitations  GASTROINTESTINAL: No abdominal or epigastric pain; No nausea, vomiting, or hematemesis; No diarrhea or constipation; No melena or hematochezia  GENITOURINARY: No dysuria, frequency or hematuria  MUSCULOSKELETAL: No joint pain, no muscle pain, no weakness  NEUROLOGICAL: No numbness or weakness  SKIN: No itching or rashes    OBJECTIVE  PAST MEDICAL & SURGICAL HISTORY  No pertinent past medical history  No significant past surgical history    ALLERGIES:  No Known Allergies    MEDICATIONS:  STANDING MEDICATIONS  chlorhexidine 4% Liquid 1 Application(s) Topical <User Schedule>  enoxaparin Injectable 40 milliGRAM(s) SubCutaneous daily  pantoprazole    Tablet 40 milliGRAM(s) Oral before breakfast  sodium chloride 0.9%. 1000 milliLiter(s) IV Continuous <Continuous>    PRN MEDICATIONS  acetaminophen   Tablet .. 650 milliGRAM(s) Oral every 6 hours PRN      VITAL SIGNS: Last 24 Hours  T(C): 36.6 (2020 05:00), Max: 37.3 (2020 07:21)  T(F): 97.8 (2020 05:00), Max: 99.1 (2020 07:21)  HR: 72 (2020 05:00) (65 - 78)  BP: 114/69 (2020 05:00) (109/61 - 126/80)  BP(mean): --  RR: 18 (2020 05:00) (18 - 18)  SpO2: 100% (2020 05:00) (98% - 100%)    LABS:                        12.1   5.71  )-----------( 122      ( 2020 05:29 )             34.4     06    142  |  106  |  5<L>  ----------------------------<  91  3.2<L>   |  24  |  0.5<L>    Ca    7.7<L>      2020 05:29  Phos  3.9       Mg     2.1         TPro  6.0  /  Alb  4.0  /  TBili  0.8  /  DBili  x   /  AST  76<H>  /  ALT  26  /  AlkPhos  63  -29    PT/INR - ( 2020 05:29 )   PT: 11.30 sec;   INR: 0.98 ratio         PTT - ( 2020 05:29 )  PTT:29.8 sec  Urinalysis Basic - ( 2020 22:21 )    Color: Colorless / Appearance: Clear / S.005 / pH: x  Gluc: x / Ketone: Negative  / Bili: Negative / Urobili: <2 mg/dL   Blood: x / Protein: Negative / Nitrite: Negative   Leuk Esterase: Negative / RBC: x / WBC x   Sq Epi: x / Non Sq Epi: x / Bacteria: x          CARDIAC MARKERS ( 2020 05:29 )  x     / <0.01 ng/mL / 1852 U/L / x     / 40.2 ng/mL  CARDIAC MARKERS ( 2020 22:38 )  x     / x     / 2601 U/L / x     / x          RADIOLOGY:  < from: Xray Chest 1 View- PORTABLE-Urgent (20 @ 00:19) >    Impression:      No radiographic evidence of acute cardiopulmonary disease.      PHYSICAL EXAM:  CONSTITUTIONAL: No acute distress, well-developed, well-groomed, AAOx3  HEAD: Atraumatic, normocephalic  EYES: EOM intact, PERRLA, conjunctiva and sclera clear  ENT: Supple, no masses, no thyromegaly, no bruits, no JVD; moist mucous membranes  PULMONARY: Clear to auscultation bilaterally; no wheezes, rales, or rhonchi  CARDIOVASCULAR: Regular rate and rhythm; no murmurs, rubs, or gallops  GASTROINTESTINAL: Soft, non-tender, non-distended; bowel sounds present  MUSCULOSKELETAL: 2+ peripheral pulses; no clubbing, no cyanosis, no edema  NEUROLOGY: non-focal  SKIN: No rashes or lesions; warm and dry      PAST MEDICAL & SURGICAL HISTORY:  No pertinent past medical history  No significant past surgical history

## 2020-07-01 LAB
ANA TITR SER: NEGATIVE — SIGNIFICANT CHANGE UP
ANION GAP SERPL CALC-SCNC: 9 MMOL/L — SIGNIFICANT CHANGE UP (ref 7–14)
BASOPHILS # BLD AUTO: 0.01 K/UL — SIGNIFICANT CHANGE UP (ref 0–0.2)
BASOPHILS NFR BLD AUTO: 0.3 % — SIGNIFICANT CHANGE UP (ref 0–1)
BUN SERPL-MCNC: 5 MG/DL — LOW (ref 10–20)
CALCIUM SERPL-MCNC: 8.5 MG/DL — SIGNIFICANT CHANGE UP (ref 8.5–10.1)
CHLORIDE SERPL-SCNC: 104 MMOL/L — SIGNIFICANT CHANGE UP (ref 98–110)
CK SERPL-CCNC: 1074 U/L — HIGH (ref 0–225)
CO2 SERPL-SCNC: 26 MMOL/L — SIGNIFICANT CHANGE UP (ref 17–32)
CREAT SERPL-MCNC: 0.5 MG/DL — LOW (ref 0.7–1.5)
EOSINOPHIL # BLD AUTO: 0.05 K/UL — SIGNIFICANT CHANGE UP (ref 0–0.7)
EOSINOPHIL NFR BLD AUTO: 1.7 % — SIGNIFICANT CHANGE UP (ref 0–8)
GLUCOSE SERPL-MCNC: 88 MG/DL — SIGNIFICANT CHANGE UP (ref 70–99)
HCT VFR BLD CALC: 35.7 % — LOW (ref 42–52)
HGB BLD-MCNC: 11.9 G/DL — LOW (ref 14–18)
HIV 1+2 AB+HIV1 P24 AG SERPL QL IA: SIGNIFICANT CHANGE UP
IMM GRANULOCYTES NFR BLD AUTO: 0 % — LOW (ref 0.1–0.3)
LYMPHOCYTES # BLD AUTO: 0.79 K/UL — LOW (ref 1.2–3.4)
LYMPHOCYTES # BLD AUTO: 27.4 % — SIGNIFICANT CHANGE UP (ref 20.5–51.1)
MAGNESIUM SERPL-MCNC: 1.8 MG/DL — SIGNIFICANT CHANGE UP (ref 1.8–2.4)
MCHC RBC-ENTMCNC: 29.7 PG — SIGNIFICANT CHANGE UP (ref 27–31)
MCHC RBC-ENTMCNC: 33.3 G/DL — SIGNIFICANT CHANGE UP (ref 32–37)
MCV RBC AUTO: 89 FL — SIGNIFICANT CHANGE UP (ref 80–94)
MONOCYTES # BLD AUTO: 0.45 K/UL — SIGNIFICANT CHANGE UP (ref 0.1–0.6)
MONOCYTES NFR BLD AUTO: 15.6 % — HIGH (ref 1.7–9.3)
NEUTROPHILS # BLD AUTO: 1.58 K/UL — SIGNIFICANT CHANGE UP (ref 1.4–6.5)
NEUTROPHILS NFR BLD AUTO: 55 % — SIGNIFICANT CHANGE UP (ref 42.2–75.2)
NRBC # BLD: 0 /100 WBCS — SIGNIFICANT CHANGE UP (ref 0–0)
PLATELET # BLD AUTO: 139 K/UL — SIGNIFICANT CHANGE UP (ref 130–400)
POTASSIUM SERPL-MCNC: 4.2 MMOL/L — SIGNIFICANT CHANGE UP (ref 3.5–5)
POTASSIUM SERPL-SCNC: 4.2 MMOL/L — SIGNIFICANT CHANGE UP (ref 3.5–5)
RBC # BLD: 4.01 M/UL — LOW (ref 4.7–6.1)
RBC # FLD: 13.3 % — SIGNIFICANT CHANGE UP (ref 11.5–14.5)
SODIUM SERPL-SCNC: 139 MMOL/L — SIGNIFICANT CHANGE UP (ref 135–146)
WBC # BLD: 2.88 K/UL — LOW (ref 4.8–10.8)
WBC # FLD AUTO: 2.88 K/UL — LOW (ref 4.8–10.8)

## 2020-07-01 PROCEDURE — 99233 SBSQ HOSP IP/OBS HIGH 50: CPT

## 2020-07-01 RX ORDER — SODIUM CHLORIDE 9 MG/ML
1000 INJECTION INTRAMUSCULAR; INTRAVENOUS; SUBCUTANEOUS
Refills: 0 | Status: DISCONTINUED | OUTPATIENT
Start: 2020-07-01 | End: 2020-07-02

## 2020-07-01 RX ORDER — LORATADINE 10 MG/1
10 TABLET ORAL DAILY
Refills: 0 | Status: DISCONTINUED | OUTPATIENT
Start: 2020-07-01 | End: 2020-07-02

## 2020-07-01 RX ORDER — SODIUM CHLORIDE 9 MG/ML
500 INJECTION INTRAMUSCULAR; INTRAVENOUS; SUBCUTANEOUS ONCE
Refills: 0 | Status: COMPLETED | OUTPATIENT
Start: 2020-07-01 | End: 2020-07-01

## 2020-07-01 RX ORDER — FLUTICASONE PROPIONATE 50 MCG
1 SPRAY, SUSPENSION NASAL
Refills: 0 | Status: DISCONTINUED | OUTPATIENT
Start: 2020-07-01 | End: 2020-07-02

## 2020-07-01 RX ADMIN — Medication 1 TABLET(S): at 11:36

## 2020-07-01 RX ADMIN — Medication 1 SPRAY(S): at 17:11

## 2020-07-01 RX ADMIN — SODIUM CHLORIDE 150 MILLILITER(S): 9 INJECTION INTRAMUSCULAR; INTRAVENOUS; SUBCUTANEOUS at 05:44

## 2020-07-01 RX ADMIN — CHLORHEXIDINE GLUCONATE 1 APPLICATION(S): 213 SOLUTION TOPICAL at 05:44

## 2020-07-01 RX ADMIN — Medication 100 MILLIGRAM(S): at 11:36

## 2020-07-01 RX ADMIN — ENOXAPARIN SODIUM 40 MILLIGRAM(S): 100 INJECTION SUBCUTANEOUS at 11:36

## 2020-07-01 RX ADMIN — Medication 1 MILLIGRAM(S): at 11:36

## 2020-07-01 RX ADMIN — PANTOPRAZOLE SODIUM 40 MILLIGRAM(S): 20 TABLET, DELAYED RELEASE ORAL at 05:44

## 2020-07-01 RX ADMIN — SODIUM CHLORIDE 1000 MILLILITER(S): 9 INJECTION INTRAMUSCULAR; INTRAVENOUS; SUBCUTANEOUS at 14:59

## 2020-07-01 RX ADMIN — LORATADINE 10 MILLIGRAM(S): 10 TABLET ORAL at 11:36

## 2020-07-01 NOTE — PROGRESS NOTE ADULT - ASSESSMENT
35 yr old Male with no Pmh or Psh came to ED with chief complaint of  3 days of subjective fever, body aches, eye aches,  fatigue , bifrontal headache. A week ago he noticed cough with blood tinged sputum, pleuritic chest pain. he also reports of having loose watery stools for 4 days around 4-5 Bm per day.  Has covid 19 exposure as one of his roomates was tested positive last week    # viral illness   - WILL repeat COVID swab today   - will keep patient on airborne + contact precautions pending repeat covid swab  - Flu A, B , RSV negative  - CXR: no congestion/opacities  - UA negative for hematuria, fu urine cx (sent by ED)  - will make IV 75 ml /h   - CK trending down 1 k   - Supportive care: tylenol  pain, anti tussive, antiemetics  - CRP: 0.14, calcium: 7.7 , phosphorus: 3.9, uric acid: 3.6, procal: 0.04,  ESR 8  - EKG showed normal sinus rhythm  - GC/chlamydia were negative  - HIV negative    --> may need to do a CT head with contrast if repeat CK keeps trending down ( <1000): patient still complaining of headache and pain in both eyes    #diarrhea   - resolved    #Misc:  ACTIVITY: Increase as tolerated   DVT PPX: lovenox   GI PPX: protonix  DIET: Regular  CODE: Full  DIsposition: from home

## 2020-07-01 NOTE — PROGRESS NOTE ADULT - ATTENDING COMMENTS
I saw and examined the patient independently. I agree with above history, physical exam and plan of care which I have reviewed and edited where appropriate with following additions.     looks comfortable in bed/ reports persistent frontal HA/ diarrhea is improving.     Rhabdomyolysis with fever/ headache/ diarrhea possible viral syndrome:   CK trending down/ oral intake not that great yet/ cw IVF hydration.   still has HA/ tylenol prn.   patient had lymphopenia on admission with elevated CRP/ initial Covid swab neg.   will send Covid swab again.   patient does report some seasonal allergies/ would start claritin / flonase empirically.    dvt ppx lovenox     Progress note Handoff:   Pending: clinical improvement / Covid re-swab .  Discussion: plan of care with  patient /  satisfied- all questions answered.  Disposition: home when stable

## 2020-07-01 NOTE — PROGRESS NOTE ADULT - SUBJECTIVE AND OBJECTIVE BOX
Patient is a 35y old  Male who presents with a chief complaint of rhabdomyolysis (30 Jun 2020 13:02)      OVERNIGHT EVENTS: no acute events overnight    SUBJECTIVE / INTERVAL HPI: Patient seen and examined at bedside.     VITAL SIGNS:  Vital Signs Last 24 Hrs  T(C): 36.2 (01 Jul 2020 05:00), Max: 37.1 (30 Jun 2020 13:24)  T(F): 97.1 (01 Jul 2020 05:00), Max: 98.7 (30 Jun 2020 13:24)  HR: 66 (01 Jul 2020 05:00) (66 - 74)  BP: 110/73 (01 Jul 2020 05:00) (110/73 - 117/78)  BP(mean): --  RR: 19 (01 Jul 2020 05:00) (18 - 19)  SpO2: 97% (30 Jun 2020 21:33) (97% - 97%)    PHYSICAL EXAM:    General: WDWN  HEENT: NC/AT; PERRL, clear conjunctiva  Neck: supple  Cardiovascular: +S1/S2; RRR  Respiratory: CTA b/l; no W/R/R  Gastrointestinal: soft, NT/ND; +BSx4  Extremities: WWP; 2+ peripheral pulses; no edema   Neurological: AAOx3; no focal deficits    MEDICATIONS:  MEDICATIONS  (STANDING):  chlorhexidine 4% Liquid 1 Application(s) Topical <User Schedule>  enoxaparin Injectable 40 milliGRAM(s) SubCutaneous daily  fluticasone propionate 50 MICROgram(s)/spray Nasal Spray 1 Spray(s) Both Nostrils two times a day  folic acid 1 milliGRAM(s) Oral daily  loratadine 10 milliGRAM(s) Oral daily  multivitamin 1 Tablet(s) Oral daily  pantoprazole    Tablet 40 milliGRAM(s) Oral before breakfast  sodium chloride 0.9%. 1000 milliLiter(s) (75 mL/Hr) IV Continuous <Continuous>  thiamine 100 milliGRAM(s) Oral daily    MEDICATIONS  (PRN):  acetaminophen   Tablet .. 650 milliGRAM(s) Oral every 6 hours PRN Temp greater or equal to 38.5C (101.3F), Mild Pain (1 - 3)      ALLERGIES:  Allergies    No Known Allergies    Intolerances        LABS:                        11.9   2.88  )-----------( 139      ( 01 Jul 2020 05:30 )             35.7     07-01    139  |  104  |  5<L>  ----------------------------<  88  4.2   |  26  |  0.5<L>    Ca    8.5      01 Jul 2020 05:30  Mg     1.8     07-01    TPro  6.1  /  Alb  4.0  /  TBili  0.5  /  DBili  x   /  AST  67<H>  /  ALT  29  /  AlkPhos  76  06-30        CAPILLARY BLOOD GLUCOSE Patient is a 35y old  Male who presents with a chief complaint of rhabdomyolysis (30 Jun 2020 13:02)      OVERNIGHT EVENTS: no acute events overnight    SUBJECTIVE / INTERVAL HPI: Patient seen and examined at bedside. denies any complaints, no fever or chills    VITAL SIGNS:  Vital Signs Last 24 Hrs  T(C): 36.2 (01 Jul 2020 05:00), Max: 37.1 (30 Jun 2020 13:24)  T(F): 97.1 (01 Jul 2020 05:00), Max: 98.7 (30 Jun 2020 13:24)  HR: 66 (01 Jul 2020 05:00) (66 - 74)  BP: 110/73 (01 Jul 2020 05:00) (110/73 - 117/78)  BP(mean): --  RR: 19 (01 Jul 2020 05:00) (18 - 19)  SpO2: 97% (30 Jun 2020 21:33) (97% - 97%)    PHYSICAL EXAM:    General: WDWN  HEENT: NC/AT; PERRL, clear conjunctiva  Neck: supple  Cardiovascular: +S1/S2; RRR  Respiratory: CTA b/l; no W/R/R  Gastrointestinal: soft, NT/ND; +BSx4  Extremities: WWP; 2+ peripheral pulses; no edema   Neurological: AAOx3; no focal deficits    MEDICATIONS:  MEDICATIONS  (STANDING):  chlorhexidine 4% Liquid 1 Application(s) Topical <User Schedule>  enoxaparin Injectable 40 milliGRAM(s) SubCutaneous daily  fluticasone propionate 50 MICROgram(s)/spray Nasal Spray 1 Spray(s) Both Nostrils two times a day  folic acid 1 milliGRAM(s) Oral daily  loratadine 10 milliGRAM(s) Oral daily  multivitamin 1 Tablet(s) Oral daily  pantoprazole    Tablet 40 milliGRAM(s) Oral before breakfast  sodium chloride 0.9%. 1000 milliLiter(s) (75 mL/Hr) IV Continuous <Continuous>  thiamine 100 milliGRAM(s) Oral daily    MEDICATIONS  (PRN):  acetaminophen   Tablet .. 650 milliGRAM(s) Oral every 6 hours PRN Temp greater or equal to 38.5C (101.3F), Mild Pain (1 - 3)      ALLERGIES:  Allergies    No Known Allergies    Intolerances        LABS:                        11.9   2.88  )-----------( 139      ( 01 Jul 2020 05:30 )             35.7     07-01    139  |  104  |  5<L>  ----------------------------<  88  4.2   |  26  |  0.5<L>    Ca    8.5      01 Jul 2020 05:30  Mg     1.8     07-01    TPro  6.1  /  Alb  4.0  /  TBili  0.5  /  DBili  x   /  AST  67<H>  /  ALT  29  /  AlkPhos  76  06-30        CAPILLARY BLOOD GLUCOSE

## 2020-07-02 ENCOUNTER — TRANSCRIPTION ENCOUNTER (OUTPATIENT)
Age: 35
End: 2020-07-02

## 2020-07-02 VITALS — SYSTOLIC BLOOD PRESSURE: 109 MMHG | DIASTOLIC BLOOD PRESSURE: 67 MMHG | HEART RATE: 77 BPM

## 2020-07-02 PROBLEM — Z78.9 OTHER SPECIFIED HEALTH STATUS: Chronic | Status: ACTIVE | Noted: 2020-06-29

## 2020-07-02 LAB
ANION GAP SERPL CALC-SCNC: 8 MMOL/L — SIGNIFICANT CHANGE UP (ref 7–14)
BASOPHILS # BLD AUTO: 0.02 K/UL — SIGNIFICANT CHANGE UP (ref 0–0.2)
BASOPHILS NFR BLD AUTO: 0.5 % — SIGNIFICANT CHANGE UP (ref 0–1)
BUN SERPL-MCNC: 8 MG/DL — LOW (ref 10–20)
CALCIUM SERPL-MCNC: 8.4 MG/DL — LOW (ref 8.5–10.1)
CHLORIDE SERPL-SCNC: 103 MMOL/L — SIGNIFICANT CHANGE UP (ref 98–110)
CK SERPL-CCNC: 589 U/L — HIGH (ref 0–225)
CO2 SERPL-SCNC: 28 MMOL/L — SIGNIFICANT CHANGE UP (ref 17–32)
CREAT SERPL-MCNC: 0.6 MG/DL — LOW (ref 0.7–1.5)
CRP SERPL-MCNC: 0.1 MG/DL — SIGNIFICANT CHANGE UP (ref 0–0.4)
EOSINOPHIL # BLD AUTO: 0.04 K/UL — SIGNIFICANT CHANGE UP (ref 0–0.7)
EOSINOPHIL NFR BLD AUTO: 1 % — SIGNIFICANT CHANGE UP (ref 0–8)
GLUCOSE SERPL-MCNC: 96 MG/DL — SIGNIFICANT CHANGE UP (ref 70–99)
HCT VFR BLD CALC: 36.9 % — LOW (ref 42–52)
HGB BLD-MCNC: 12.8 G/DL — LOW (ref 14–18)
IMM GRANULOCYTES NFR BLD AUTO: 0.2 % — SIGNIFICANT CHANGE UP (ref 0.1–0.3)
LYMPHOCYTES # BLD AUTO: 0.95 K/UL — LOW (ref 1.2–3.4)
LYMPHOCYTES # BLD AUTO: 22.9 % — SIGNIFICANT CHANGE UP (ref 20.5–51.1)
MCHC RBC-ENTMCNC: 30.9 PG — SIGNIFICANT CHANGE UP (ref 27–31)
MCHC RBC-ENTMCNC: 34.7 G/DL — SIGNIFICANT CHANGE UP (ref 32–37)
MCV RBC AUTO: 89.1 FL — SIGNIFICANT CHANGE UP (ref 80–94)
MONOCYTES # BLD AUTO: 0.53 K/UL — SIGNIFICANT CHANGE UP (ref 0.1–0.6)
MONOCYTES NFR BLD AUTO: 12.8 % — HIGH (ref 1.7–9.3)
NEUTROPHILS # BLD AUTO: 2.59 K/UL — SIGNIFICANT CHANGE UP (ref 1.4–6.5)
NEUTROPHILS NFR BLD AUTO: 62.6 % — SIGNIFICANT CHANGE UP (ref 42.2–75.2)
NRBC # BLD: 0 /100 WBCS — SIGNIFICANT CHANGE UP (ref 0–0)
PLATELET # BLD AUTO: 162 K/UL — SIGNIFICANT CHANGE UP (ref 130–400)
POTASSIUM SERPL-MCNC: 4.1 MMOL/L — SIGNIFICANT CHANGE UP (ref 3.5–5)
POTASSIUM SERPL-SCNC: 4.1 MMOL/L — SIGNIFICANT CHANGE UP (ref 3.5–5)
RBC # BLD: 4.14 M/UL — LOW (ref 4.7–6.1)
RBC # FLD: 13.4 % — SIGNIFICANT CHANGE UP (ref 11.5–14.5)
SARS-COV-2 RNA SPEC QL NAA+PROBE: SIGNIFICANT CHANGE UP
SODIUM SERPL-SCNC: 139 MMOL/L — SIGNIFICANT CHANGE UP (ref 135–146)
WBC # BLD: 4.14 K/UL — LOW (ref 4.8–10.8)
WBC # FLD AUTO: 4.14 K/UL — LOW (ref 4.8–10.8)

## 2020-07-02 PROCEDURE — 99239 HOSP IP/OBS DSCHRG MGMT >30: CPT

## 2020-07-02 RX ORDER — FLUTICASONE PROPIONATE 50 MCG
1 SPRAY, SUSPENSION NASAL
Qty: 14 | Refills: 0
Start: 2020-07-02 | End: 2020-07-15

## 2020-07-02 RX ORDER — LORATADINE 10 MG/1
1 TABLET ORAL
Qty: 12 | Refills: 0
Start: 2020-07-02 | End: 2020-07-13

## 2020-07-02 RX ADMIN — Medication 1 SPRAY(S): at 05:12

## 2020-07-02 RX ADMIN — PANTOPRAZOLE SODIUM 40 MILLIGRAM(S): 20 TABLET, DELAYED RELEASE ORAL at 05:12

## 2020-07-02 RX ADMIN — CHLORHEXIDINE GLUCONATE 1 APPLICATION(S): 213 SOLUTION TOPICAL at 05:12

## 2020-07-02 RX ADMIN — SODIUM CHLORIDE 75 MILLILITER(S): 9 INJECTION INTRAMUSCULAR; INTRAVENOUS; SUBCUTANEOUS at 00:10

## 2020-07-02 NOTE — DISCHARGE NOTE NURSING/CASE MANAGEMENT/SOCIAL WORK - PATIENT PORTAL LINK FT
You can access the FollowMyHealth Patient Portal offered by [facility] by registering at the following website: [email or web address]. By joining [facility]’s FollowMyHealth portal, you will also be able to view your health information using other applications (apps) compatible with our system.

## 2020-07-02 NOTE — DISCHARGE NOTE PROVIDER - NSDCCPCAREPLAN_GEN_ALL_CORE_FT
PRINCIPAL DISCHARGE DIAGNOSIS  Diagnosis: Rhabdomyolysis  Assessment and Plan of Treatment: you were diagnosed with muscle injury secondary to overexertion   you need to keep yourself well hydrated      SECONDARY DISCHARGE DIAGNOSES  Diagnosis: Tension type headache  Assessment and Plan of Treatment: you were diagnosed to have tension type headache   you can take some pain killers in case headache is severe   you need to take nasal steroids and antihistamines for suspected allergic reaction

## 2020-07-02 NOTE — DISCHARGE NOTE PROVIDER - CARE PROVIDER_API CALL
CHRISTOPHER GARCIA  Internal Medicine  [address] [place] [id number]  Phone: [phone]  Fax: [phone]  Follow Up Time: Routine

## 2020-07-02 NOTE — DISCHARGE NOTE PROVIDER - HOSPITAL COURSE
35 yr old Male with no Pmh or Psh came to ED with chief complaint of  3 days of subjective fever, body aches,eye aches,  fatigue , bifrontal headache. A week ago he noticed cough with blood tinged sputum, pleuritic chest pain. he also reports of having loose watery stools for 4 days around 4-5 Bm per day.  Has covid 19 exposure as one of his roommates' was tested positive last week. Patient also reports that he has lost 10lbs in last 3 months. Denies night sweats, trauma or crush injuries, dark urine.        In ED: VS:WNL, labs significant for high CK, negative UA, Flu , RSV negative, COVID NEG CXR no congestion/diffuse opacities.     got admitted and started on IV hydration    CK trending down,    HIV, legionella, chlamydia, gonorrhea negative    NORMAN was also negative    repeat COVID negative    patient is stable clinically and hemodynamically to be discharged home today 35 yr old Male with no Pmh or Psh came to ED with chief complaint of  3 days of subjective fever, body aches,eye aches,  fatigue , bifrontal headache. A week ago he noticed cough with blood tinged sputum, pleuritic chest pain. he also reports of having loose watery stools for 4 days around 4-5 Bm per day.  Has covid 19 exposure as one of his roommates' was tested positive last week. Patient also reports that he has lost 10lbs in last 3 months. Denies night sweats, trauma or crush injuries, dark urine.        In ED: VS:WNL, labs significant for high CK, negative UA, Flu , RSV negative, COVID NEG CXR no congestion/diffuse opacities.     got admitted and started on IV hydration    CK trending down,    HIV, legionella, chlamydia, gonorrhea negative    NORMAN was also negative    repeat COVID negative    patient is stable clinically and hemodynamically to be discharged home today         Attending Attestation:     Patient seen and examined on day of discharge. doing better, no new complaints, COVid swab x 2 neg. patient reports he has seasonal allergies which could be causing some frontal headaches with sinus congestion. Pateint advised to take claritin and use flonase for now and fu ENT as OP. recent Vital signs and labs reviewed.                Vital Signs Last 24 Hrs    T(C): 36.3 (02 Jul 2020 05:39), Max: 36.8 (01 Jul 2020 21:13)    T(F): 97.3 (02 Jul 2020 05:39), Max: 98.3 (01 Jul 2020 21:13)    HR: 77 (02 Jul 2020 08:24) (70 - 83)    BP: 109/67 (02 Jul 2020 08:24) (93/60 - 165/75)    BP(mean): --    RR: 18 (02 Jul 2020 05:39) (18 - 18)    SpO2: 98% (01 Jul 2020 21:13) (98% - 98%)        Physical Examination:    General: AAO x 3   , NAD.    HEENT: PERRLA, EOMI. NO JVD.    CVS:  S1 & S2 appreciated, regular rate and rhythm, no murmurs.     Lungs: clear to auscultation, no wheezing, no rales.     Abdominal Examination: soft, nontender, nondistended    Neuro:  no focal deficits.     Extremity Examination: No edema peripherally.        Medrec reviewed and plan of discharge discussed with resident. clinically stable for discharge. MAp clinic appointment rescheduled for 7/16 @ 8: 30 am.

## 2020-07-02 NOTE — DISCHARGE NOTE PROVIDER - NSDCMRMEDTOKEN_GEN_ALL_CORE_FT
Alavert 10 mg oral tablet: 1 tab(s) orally once a day  Flonase 50 mcg/inh nasal spray: 1 spray(s) nasal 2 times a day

## 2020-07-06 DIAGNOSIS — R50.9 FEVER, UNSPECIFIED: ICD-10-CM

## 2020-07-06 DIAGNOSIS — M62.82 RHABDOMYOLYSIS: ICD-10-CM

## 2020-07-06 DIAGNOSIS — G44.209 TENSION-TYPE HEADACHE, UNSPECIFIED, NOT INTRACTABLE: ICD-10-CM

## 2020-07-06 DIAGNOSIS — R19.7 DIARRHEA, UNSPECIFIED: ICD-10-CM

## 2020-07-06 DIAGNOSIS — R63.4 ABNORMAL WEIGHT LOSS: ICD-10-CM

## 2020-07-06 DIAGNOSIS — J06.9 ACUTE UPPER RESPIRATORY INFECTION, UNSPECIFIED: ICD-10-CM

## 2020-07-06 DIAGNOSIS — Z20.828 CONTACT WITH AND (SUSPECTED) EXPOSURE TO OTHER VIRAL COMMUNICABLE DISEASES: ICD-10-CM

## 2020-07-16 ENCOUNTER — APPOINTMENT (OUTPATIENT)
Dept: INTERNAL MEDICINE | Facility: CLINIC | Age: 35
End: 2020-07-16

## 2020-09-02 ENCOUNTER — OUTPATIENT (OUTPATIENT)
Dept: OUTPATIENT SERVICES | Facility: HOSPITAL | Age: 35
LOS: 1 days | Discharge: HOME | End: 2020-09-02

## 2020-09-02 ENCOUNTER — APPOINTMENT (OUTPATIENT)
Dept: INTERNAL MEDICINE | Facility: CLINIC | Age: 35
End: 2020-09-02
Payer: SELF-PAY

## 2020-09-02 VITALS
HEART RATE: 67 BPM | SYSTOLIC BLOOD PRESSURE: 114 MMHG | HEIGHT: 60 IN | DIASTOLIC BLOOD PRESSURE: 78 MMHG | WEIGHT: 315 LBS | BODY MASS INDEX: 61.84 KG/M2 | TEMPERATURE: 97.5 F

## 2020-09-02 DIAGNOSIS — R09.81 NASAL CONGESTION: ICD-10-CM

## 2020-09-02 DIAGNOSIS — Z00.00 ENCOUNTER FOR GENERAL ADULT MEDICAL EXAMINATION W/OUT ABNORMAL FINDINGS: ICD-10-CM

## 2020-09-02 DIAGNOSIS — M79.602 PAIN IN LEFT ARM: ICD-10-CM

## 2020-09-02 DIAGNOSIS — M54.2 CERVICALGIA: ICD-10-CM

## 2020-09-02 DIAGNOSIS — Z87.891 PERSONAL HISTORY OF NICOTINE DEPENDENCE: ICD-10-CM

## 2020-09-02 PROCEDURE — 99213 OFFICE O/P EST LOW 20 MIN: CPT | Mod: GC

## 2020-09-02 RX ORDER — MELOXICAM 15 MG/1
15 TABLET ORAL DAILY
Qty: 15 | Refills: 2 | Status: ACTIVE | COMMUNITY
Start: 2020-09-02 | End: 1900-01-01

## 2020-09-02 NOTE — HEALTH RISK ASSESSMENT
[No] : No [0] : 2) Feeling down, depressed, or hopeless: Not at all (0) [] : No [de-identified] : Fomer smoker. Quit 2 years ago. [YearQuit] : 2

## 2020-09-02 NOTE — HISTORY OF PRESENT ILLNESS
[FreeTextEntry1] : 35 yr old Male with no Pmh recently admitted in the hospital for fever, body aches,eye aches, fatigue , bifrontal headache. cough with blood tinged sputum, pleuritic chest pain, loose watery stools. Has covid 19 exposure as one of his roommates' was tested positive. Patient also reports that he has lost 10 lbs in last 3 months.\par In office today with left side neck pain radiating to left arm, headache while working in [employer]. Pain resolved after he returns back from work. Takes Aleve for pain occasionally. Reports feeling weakness in left arm during pain.\par Also mentions having nasal congestion. Was prescribed Flonase spray and Tab. Alavert on discharge but did not take these medications. He uses another medication for nasal congestion that he does not remember the name.

## 2020-09-02 NOTE — PLAN
[FreeTextEntry1] : # Meloxicam for pain.\par # counselled for proper sleep posture\par # Blood work ordered.\par # continue Alavert 10 mg PRN for nasal congestion\par # continue Flonase nasal spray\par # Xray cervical spine for neck pain.\par # COVID antibody testing ordered.\par # RTC in 1 month.\par

## 2020-09-02 NOTE — REVIEW OF SYSTEMS
[Muscle Pain] : muscle pain [Muscle Weakness] : muscle weakness [Headache] : headache [Negative] : Integumentary [Joint Pain] : no joint pain [Joint Stiffness] : no joint stiffness [Joint Swelling] : no joint swelling [Back Pain] : no back pain [Dizziness] : no dizziness [Memory Loss] : no memory loss [Unsteady Walk] : no ataxia

## 2020-09-03 DIAGNOSIS — M79.602 PAIN IN LEFT ARM: ICD-10-CM

## 2020-09-03 DIAGNOSIS — Z87.891 PERSONAL HISTORY OF NICOTINE DEPENDENCE: ICD-10-CM

## 2020-09-03 DIAGNOSIS — Z00.00 ENCOUNTER FOR GENERAL ADULT MEDICAL EXAMINATION WITHOUT ABNORMAL FINDINGS: ICD-10-CM

## 2020-09-03 DIAGNOSIS — M54.2 CERVICALGIA: ICD-10-CM

## 2020-09-03 DIAGNOSIS — R09.81 NASAL CONGESTION: ICD-10-CM

## 2020-10-14 ENCOUNTER — APPOINTMENT (OUTPATIENT)
Dept: INTERNAL MEDICINE | Facility: CLINIC | Age: 35
End: 2020-10-14

## 2023-09-14 NOTE — DISCHARGE NOTE NURSING/CASE MANAGEMENT/SOCIAL WORK - NSDCFUADDAPPT_GEN_ALL_CORE_FT
follow up with dr Ralf rhodes on september 2, 2020 at 1:30 pm   in the [address] (no pain/absence of nonverbal indicators of pain)

## 2025-02-11 NOTE — ED ADULT NURSE NOTE - OBJECTIVE STATEMENT
Patient admitted to room 4264  from [facility]  . Patient is alert and oriented. Vitals recorded. /95 . Tele monitor in place. Patient rated pain 5/10 to right shoulder and Left lower quadrant. Patient verbalized that the pain in her LLQ began yesterday after coughing she felt a popping sensation and tightness is the area and a small knot. Dr. Gates updated via secured message. Patient oriented to room and call light. All questions answered no concerns voiced. Patient resting in bed. Wheels locked, call light within reach.   Patient reports he has had 1 week of muscle aches, chills, pain on inspiration and difficulty urinating.